# Patient Record
Sex: MALE | Race: WHITE | NOT HISPANIC OR LATINO | Employment: FULL TIME | URBAN - NONMETROPOLITAN AREA
[De-identification: names, ages, dates, MRNs, and addresses within clinical notes are randomized per-mention and may not be internally consistent; named-entity substitution may affect disease eponyms.]

---

## 2018-06-29 ENCOUNTER — OFFICE VISIT (OUTPATIENT)
Dept: URGENT CARE | Facility: PHYSICIAN GROUP | Age: 33
End: 2018-06-29
Payer: COMMERCIAL

## 2018-06-29 VITALS
OXYGEN SATURATION: 99 % | DIASTOLIC BLOOD PRESSURE: 78 MMHG | SYSTOLIC BLOOD PRESSURE: 116 MMHG | HEIGHT: 73 IN | BODY MASS INDEX: 25.18 KG/M2 | WEIGHT: 190 LBS | RESPIRATION RATE: 16 BRPM | TEMPERATURE: 98 F | HEART RATE: 62 BPM

## 2018-06-29 DIAGNOSIS — S39.012A LUMBOSACRAL STRAIN, INITIAL ENCOUNTER: Primary | ICD-10-CM

## 2018-06-29 PROCEDURE — 99204 OFFICE O/P NEW MOD 45 MIN: CPT | Performed by: PHYSICIAN ASSISTANT

## 2018-06-29 RX ORDER — METHYLPREDNISOLONE 4 MG/1
TABLET ORAL
Qty: 1 KIT | Refills: 0 | Status: SHIPPED | OUTPATIENT
Start: 2018-06-29 | End: 2021-04-09

## 2018-06-29 RX ORDER — CYCLOBENZAPRINE HCL 10 MG
10 TABLET ORAL 3 TIMES DAILY PRN
Qty: 30 TAB | Refills: 0 | Status: SHIPPED | OUTPATIENT
Start: 2018-06-29 | End: 2021-04-09 | Stop reason: SDUPTHER

## 2018-06-29 ASSESSMENT — ENCOUNTER SYMPTOMS
CHILLS: 0
TINGLING: 0
MYALGIAS: 1
FOCAL WEAKNESS: 0
FEVER: 0
BOWEL INCONTINENCE: 0
SENSORY CHANGE: 0
BACK PAIN: 1

## 2018-06-29 NOTE — LETTER
June 29, 2018         Patient: Ayo Reyna   YOB: 1985   Date of Visit: 6/29/2018           To Whom it May Concern:    Ayo Reyna was seen in my clinic on 6/29/2018. He may return to work on 07/01/2018 or sooner if condition improves sooner.     If you have any questions or concerns, please don't hesitate to call.        Sincerely,           Alesia Nielson P.A.-C.  Electronically Signed

## 2018-06-29 NOTE — PROGRESS NOTES
"Subjective:      Ayo Reyna is a 33 y.o. male who presents with Back Pain (x1d lower back pain/ Pt state that he injured himself 10 yrs ago and now the pain is coming back)    PMH:  Reviewed with patient/family member/EPIC.   MEDS: No current outpatient prescriptions on file.  ALLERGIES: Not on File  SURGHX: History reviewed. No pertinent surgical history.  SOCHX:  reports that he quit smoking about 2 years ago. His smoking use included Cigarettes. He has never used smokeless tobacco. He reports that he does not use drugs.  FH: Reviewed with patient/family. Not pertinent to this complaint.            Patient presents with:  Back Pain: x1d lower back pain/ Pt state that he injured himself 10 yrs ago and pain is coming back.  No particular injury , just woke up with spasms and occasional sharp pains with twisting or bending. Denies incontinence.           Back Pain   This is a new problem. The current episode started yesterday. The problem occurs constantly. The pain is present in the lumbar spine. The quality of the pain is described as cramping and aching. The pain is at a severity of 7/10. The pain is the same all the time. The symptoms are aggravated by twisting, sitting, lying down, coughing and bending. Stiffness is present all day. Pertinent negatives include no bladder incontinence, bowel incontinence, dysuria, fever or tingling. He has tried nothing for the symptoms. The treatment provided no relief.       Review of Systems   Constitutional: Negative for chills and fever.   Gastrointestinal: Negative for bowel incontinence.   Genitourinary: Negative for bladder incontinence and dysuria.   Musculoskeletal: Positive for back pain and myalgias.   Neurological: Negative for tingling, sensory change and focal weakness.   All other systems reviewed and are negative.         Objective:     /78   Pulse 62   Temp 36.7 °C (98 °F)   Resp 16   Ht 1.854 m (6' 1\")   Wt 86.2 kg (190 lb)   SpO2 99%   BMI " 25.07 kg/m²      Physical Exam   Constitutional: He is oriented to person, place, and time. He appears well-developed and well-nourished. No distress.   HENT:   Head: Normocephalic.   Eyes: Pupils are equal, round, and reactive to light.   Neck: Normal range of motion. Neck supple.   Cardiovascular: Normal rate, regular rhythm and normal heart sounds.    Pulmonary/Chest: Effort normal and breath sounds normal.   Abdominal: Soft. Bowel sounds are normal.   Musculoskeletal:        Lumbar back: He exhibits decreased range of motion (Secondary to pain/spasm), tenderness, pain and spasm. He exhibits no bony tenderness and normal pulse.   DISTAL N/V INTACT TO BLE, NO SADDLE ANESTHESIA NOTED, NO MIDLINE TTP TO ENTIRE SPINE.    Neurological: He is alert and oriented to person, place, and time. He has normal reflexes. He exhibits normal muscle tone. Coordination normal.   Skin: Skin is warm and dry.   Psychiatric: He has a normal mood and affect.   Nursing note and vitals reviewed.              Assessment/Plan:     1. Lumbosacral strain, initial encounter  cyclobenzaprine (FLEXERIL) 10 MG Tab    MethylPREDNISolone (MEDROL DOSEPAK) 4 MG Tablet Therapy Pack     Motrin/Advil/Ibuprophen 600 mg every 6 hours as needed for pain or fever.    PT instructed not to drive or operate heavy machinery or drink alcohol while taking this medication because it contains either a narcotic or benzodiazepines which causes drowsiness. PT verbalized understanding of these instructions.     [facility] Aware web site evaluation: I have obtained and reviewed patient utilization report from [facility] pharmacy database prior to writing prescription for controlled substance.  No history of abuse.    PT should follow up with PCP in 1-2 days for re-evaluation if symptoms have not improved.  Discussed red flags and reasons to return to UC or ED.  Pt and/or family verbalized understanding of diagnosis and follow up instructions and was offered  informational handout on diagnosis.  PT discharged.

## 2018-06-29 NOTE — PATIENT INSTRUCTIONS
Lumbosacral Strain  Lumbosacral strain is an injury that causes pain in the lower back (lumbosacral spine). This injury usually occurs from overstretching the muscles or ligaments along your spine. A strain can affect one or more muscles or cord-like tissues that connect bones to other bones (ligaments).  What are the causes?  This condition may be caused by:  · A hard, direct hit (blow) to the back.  · Excessive stretching of the lower back muscles. This may result from:  ¨ A fall.  ¨ Lifting something heavy.  ¨ Repetitive movements such as bending or crouching.  What increases the risk?  The following factors may increase your risk of getting this condition:  · Participating in sports or activities that involve:  ¨ A sudden twist of the back.  ¨ Pushing or pulling motions.  · Being overweight or obese.  · Having poor strength and flexibility, especially tight hamstrings or weak muscles in the back or abdomen.  · Having too much of a curve in the lower back.  · Having a pelvis that is tilted forward.  What are the signs or symptoms?  The main symptom of this condition is pain in the lower back, at the site of the strain. Pain may extend (radiate) down one or both legs.  How is this diagnosed?  This condition is diagnosed based on:  · Your symptoms.  · Your medical history.  · A physical exam.  ¨ Your health care provider may push on certain areas of your back to determine the source of your pain.  ¨ You may be asked to bend forward, backward, and side to side to assess the severity of your pain and your range of motion.  · Imaging tests, such as:  ¨ X-rays.  ¨ MRI.  How is this treated?  Treatment for this condition may include:  · Putting heat and cold on the affected area.  · Medicines to help relieve pain and relax your muscles (muscle relaxants).  · NSAIDs to help reduce swelling and discomfort.  When your symptoms improve, it is important to gradually return to your normal routine as soon as possible to reduce  pain, avoid stiffness, and avoid loss of muscle strength. Generally, symptoms should improve within 6 weeks of treatment. However, recovery time varies.  Follow these instructions at home:  Managing pain, stiffness, and swelling  · If directed, put ice on the injured area during the first 24 hours after your strain.  ¨ Put ice in a plastic bag.  ¨ Place a towel between your skin and the bag.  ¨ Leave the ice on for 20 minutes, 2-3 times a day.  · If directed, put heat on the affected area as often as told by your health care provider. Use the heat source that your health care provider recommends, such as a moist heat pack or a heating pad.  ¨ Place a towel between your skin and the heat source.  ¨ Leave the heat on for 20-30 minutes.  ¨ Remove the heat if your skin turns bright red. This is especially important if you are unable to feel pain, heat, or cold. You may have a greater risk of getting burned.  Activity  · Rest and return to your normal activities as told by your health care provider. Ask your health care provider what activities are safe for you.  · Avoid activities that take a lot of energy for as long as told by your health care provider.  General instructions  · Take over-the-counter and prescription medicines only as told by your health care provider.  · Do not drive or use heavy machinery while taking prescription pain medicine.  · Do not use any products that contain nicotine or tobacco, such as cigarettes and e-cigarettes. If you need help quitting, ask your health care provider.  · Keep all follow-up visits as told by your health care provider. This is important.  How is this prevented?  · Use correct form when playing sports and lifting heavy objects.  · Use good posture when sitting and standing.  · Maintain a healthy weight.  · Sleep on a mattress with medium firmness to support your back.  · Be safe and responsible while being active to avoid falls.  · Do at least 150 minutes of  moderate-intensity exercise each week, such as brisk walking or water aerobics. Try a form of exercise that takes stress off your back, such as swimming or stationary cycling.  · Maintain physical fitness, including:  ¨ Strength.  ¨ Flexibility.  ¨ Cardiovascular fitness.  ¨ Endurance.  Contact a health care provider if:  · Your back pain does not improve after 6 weeks of treatment.  · Your symptoms get worse.  Get help right away if:  · Your back pain is severe.  · You cannot stand or walk.  · You have difficulty controlling when you urinate or when you have a bowel movement.  · You feel nauseous or you vomit.  · Your feet get very cold.  · You have numbness, tingling, weakness, or problems using your arms or legs.  · You develop any of the following:  ¨ Shortness of breath.  ¨ Dizziness.  ¨ Pain in your legs.  ¨ Weakness in your buttocks or legs.  ¨ Discoloration of the skin on your toes or legs.  This information is not intended to replace advice given to you by your health care provider. Make sure you discuss any questions you have with your health care provider.  Document Released: 09/27/2006 Document Revised: 07/07/2017 Document Reviewed: 05/21/2017  [employer] [facility] Interactive Patient Education © 2017 [employer]

## 2018-08-14 ENCOUNTER — OFFICE VISIT (OUTPATIENT)
Dept: URGENT CARE | Facility: PHYSICIAN GROUP | Age: 33
End: 2018-08-14
Payer: COMMERCIAL

## 2018-08-14 ENCOUNTER — APPOINTMENT (OUTPATIENT)
Dept: RADIOLOGY | Facility: MEDICAL CENTER | Age: 33
End: 2018-08-14
Attending: NURSE PRACTITIONER
Payer: COMMERCIAL

## 2018-08-14 VITALS
TEMPERATURE: 97.9 F | SYSTOLIC BLOOD PRESSURE: 120 MMHG | BODY MASS INDEX: 23.46 KG/M2 | DIASTOLIC BLOOD PRESSURE: 76 MMHG | WEIGHT: 177 LBS | OXYGEN SATURATION: 95 % | HEIGHT: 73 IN | HEART RATE: 70 BPM | RESPIRATION RATE: 16 BRPM

## 2018-08-14 DIAGNOSIS — S09.90XA INJURY OF HEAD, INITIAL ENCOUNTER: ICD-10-CM

## 2018-08-14 DIAGNOSIS — S09.93XA FACIAL INJURY, INITIAL ENCOUNTER: ICD-10-CM

## 2018-08-14 PROCEDURE — 99214 OFFICE O/P EST MOD 30 MIN: CPT | Performed by: NURSE PRACTITIONER

## 2018-08-14 ASSESSMENT — ENCOUNTER SYMPTOMS
SPEECH CHANGE: 0
NUMBNESS: 0
NAUSEA: 0
DISORIENTATION: 0
DIZZINESS: 0
FEVER: 0
TREMORS: 0
SEIZURES: 0
CHILLS: 0
SENSORY CHANGE: 0
TINGLING: 0
LOSS OF CONSCIOUSNESS: 1
BLURRED VISION: 0
FOCAL WEAKNESS: 0
HEADACHES: 1
WEAKNESS: 0
VOMITING: 0
MEMORY LOSS: 1

## 2018-08-14 NOTE — LETTER
August 14, 2018       Patient: Ayo Reyna   YOB: 1985   Date of Visit: 8/14/2018         To Whom It May Concern:    It is my medical opinion that Ayo Reyna may return to work on 8/17/18.    If you have any questions or concerns, please don't hesitate to call [phone]          Sincerely,          Cathey J Hamman, A.P.N.  Electronically Signed
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
## 2019-01-06 ENCOUNTER — OFFICE VISIT (OUTPATIENT)
Dept: URGENT CARE | Facility: PHYSICIAN GROUP | Age: 34
End: 2019-01-06
Payer: COMMERCIAL

## 2019-01-06 VITALS
WEIGHT: 178.8 LBS | RESPIRATION RATE: 18 BRPM | HEIGHT: 73 IN | OXYGEN SATURATION: 96 % | DIASTOLIC BLOOD PRESSURE: 76 MMHG | TEMPERATURE: 98.4 F | HEART RATE: 69 BPM | BODY MASS INDEX: 23.7 KG/M2 | SYSTOLIC BLOOD PRESSURE: 110 MMHG

## 2019-01-06 DIAGNOSIS — R68.89 FLU-LIKE SYMPTOMS: ICD-10-CM

## 2019-01-06 DIAGNOSIS — J02.9 SORETHROAT: ICD-10-CM

## 2019-01-06 DIAGNOSIS — Z20.818 EXPOSURE TO STREP THROAT: ICD-10-CM

## 2019-01-06 LAB
FLUAV+FLUBV AG SPEC QL IA: NEGATIVE
INT CON NEG: NEGATIVE
INT CON NEG: NEGATIVE
INT CON POS: POSITIVE
INT CON POS: POSITIVE
S PYO AG THROAT QL: NEGATIVE

## 2019-01-06 PROCEDURE — 99214 OFFICE O/P EST MOD 30 MIN: CPT | Performed by: NURSE PRACTITIONER

## 2019-01-06 PROCEDURE — 87880 STREP A ASSAY W/OPTIC: CPT | Performed by: NURSE PRACTITIONER

## 2019-01-06 PROCEDURE — 87804 INFLUENZA ASSAY W/OPTIC: CPT | Performed by: NURSE PRACTITIONER

## 2019-01-06 NOTE — PROGRESS NOTES
"Subjective:      Ayo Reyna is a 33 y.o. male who presents with Pharyngitis (daughter positive for strep) and Cough (x 3)            Patient comes in today with a 3 day history of sore throat with cough, myalgias, and nasal congestion.  Daughter was diagnosed with strep throat earlier this week.  Not taking any meds to treat the symptoms.  No fever or chills.          Review of Systems   Constitutional: Positive for malaise/fatigue. Negative for chills, diaphoresis and fever.   HENT: Positive for congestion and sore throat. Negative for ear pain.    Respiratory: Positive for cough. Negative for hemoptysis, sputum production, shortness of breath and wheezing.    Cardiovascular: Negative for chest pain.   Musculoskeletal: Positive for myalgias.   Skin: Negative for rash.   Neurological: Negative for weakness.     Medications, Allergies, and current problem list reviewed today in Epic     Objective:     /76   Pulse 69   Temp 36.9 °C (98.4 °F) (Temporal)   Resp 18   Ht 1.854 m (6' 1\")   Wt 81.1 kg (178 lb 12.8 oz)   SpO2 96%   BMI 23.59 kg/m²      Physical Exam   Constitutional: He is oriented to person, place, and time. He appears well-developed and well-nourished. No distress.   HENT:   Head: Normocephalic.   Right Ear: External ear normal.   Left Ear: External ear normal.   Profuse clear nasal drainage.  No sinus TTP.  Generalized oropharyngeal erythema with no exudate or edema.  Uvula midline.  No muffled voice.     Eyes: Pupils are equal, round, and reactive to light. Conjunctivae are normal. Right eye exhibits no discharge. Left eye exhibits no discharge. No scleral icterus.   Neck: Neck supple. No JVD present. No tracheal deviation present. No thyromegaly present.   Cardiovascular: Normal rate, regular rhythm and normal heart sounds.  Exam reveals no gallop and no friction rub.    No murmur heard.  Pulmonary/Chest: Effort normal and breath sounds normal. No stridor. No respiratory distress. " He has no wheezes. He has no rales. He exhibits no tenderness.   Wet sounding cough in clinic.   Lymphadenopathy:     He has no cervical adenopathy.   Neurological: He is alert and oriented to person, place, and time.   Skin: Skin is warm and dry. He is not diaphoretic. No erythema. No pallor.   Vitals reviewed.    POCT rapid strep a: negative  POCT influenza a/b: negative          Assessment/Plan:     1. Sorethroat  POCT Rapid Strep A   2. Exposure to strep throat     3. Flu-like symptoms  POCT Influenza A/B     Advised patient that based on the history and exam findings, this is likely a self-limiting viral illness.  There is no indication for antibiotics at this time.  OTC NSAIDs or tylenol prn fever, pain.  OTC cold medications prn symptom management.  Maintain adequate po hydration.  RTC in 5-7 days if symptoms persist, sooner if worse.  Patient verbalized understanding of and agreed with plan of care.

## 2019-01-09 ASSESSMENT — ENCOUNTER SYMPTOMS
SPUTUM PRODUCTION: 0
SHORTNESS OF BREATH: 0
COUGH: 1
CHILLS: 0
DIAPHORESIS: 0
HEMOPTYSIS: 0
MYALGIAS: 1
WHEEZING: 0
FEVER: 0
WEAKNESS: 0
SORE THROAT: 1

## 2021-04-07 ENCOUNTER — TELEPHONE (OUTPATIENT)
Dept: SCHEDULING | Facility: IMAGING CENTER | Age: 36
End: 2021-04-07

## 2021-04-09 ENCOUNTER — OFFICE VISIT (OUTPATIENT)
Dept: MEDICAL GROUP | Facility: PHYSICIAN GROUP | Age: 36
End: 2021-04-09
Payer: COMMERCIAL

## 2021-04-09 VITALS
RESPIRATION RATE: 12 BRPM | WEIGHT: 200.3 LBS | OXYGEN SATURATION: 96 % | SYSTOLIC BLOOD PRESSURE: 120 MMHG | HEIGHT: 72 IN | TEMPERATURE: 99 F | HEART RATE: 69 BPM | BODY MASS INDEX: 27.13 KG/M2 | DIASTOLIC BLOOD PRESSURE: 64 MMHG

## 2021-04-09 DIAGNOSIS — R29.2 HYPERREFLEXIA: ICD-10-CM

## 2021-04-09 DIAGNOSIS — Z13.21 ENCOUNTER FOR VITAMIN DEFICIENCY SCREENING: ICD-10-CM

## 2021-04-09 DIAGNOSIS — Z13.6 SCREENING FOR CARDIOVASCULAR CONDITION: ICD-10-CM

## 2021-04-09 DIAGNOSIS — Z13.29 SCREENING FOR THYROID DISORDER: ICD-10-CM

## 2021-04-09 DIAGNOSIS — M54.50 ACUTE LEFT-SIDED LOW BACK PAIN WITHOUT SCIATICA: ICD-10-CM

## 2021-04-09 PROCEDURE — 99214 OFFICE O/P EST MOD 30 MIN: CPT | Performed by: NURSE PRACTITIONER

## 2021-04-09 RX ORDER — CYCLOBENZAPRINE HCL 10 MG
10 TABLET ORAL 3 TIMES DAILY PRN
Qty: 30 TABLET | Refills: 1 | Status: SHIPPED | OUTPATIENT
Start: 2021-04-09

## 2021-04-09 RX ORDER — METHYLPREDNISOLONE 4 MG/1
TABLET ORAL
Qty: 21 TABLET | Refills: 0 | Status: SHIPPED | OUTPATIENT
Start: 2021-04-09 | End: 2021-06-04

## 2021-04-09 ASSESSMENT — PATIENT HEALTH QUESTIONNAIRE - PHQ9: CLINICAL INTERPRETATION OF PHQ2 SCORE: 0

## 2021-04-09 NOTE — ASSESSMENT & PLAN NOTE
Patient is 36-year-old male here to establish care with me today.  Reports acute left side back pain, onset 3 weeks ago.  Not sure how it started, but usually starts with a sneeze or lifting something light.  Positive pain is sharp with intermittent spasm over her whole back.  Aggravated by sitting, standing, bending, supine.  Walking around for an hour helps loosen it up.  Will take Aleve occasionally.  Has been doing gentle stretching.  Went to chiropractor twice, thought it was helping, but then returned.  Denies leg pain or weakness.  Denies saddle paresthesia, foot drop, fever, fecal or urinary incontinence.  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  Reportedly had a lumbar strain.  Has had flareups once or twice a year since then.  Flareups are usually relieved within 3 to 4 days.

## 2021-04-09 NOTE — PROGRESS NOTES
CC: Establish care, low back pain    HISTORY OF THE PRESENT ILLNESS: Patient is a 36 y.o. male. This pleasant patient is here today to establish care and for evaluation and management of the following health problems.  Patient has not had primary care provider in many years.          Acute left-sided low back pain without sciatica  Patient is 36-year-old male here to establish care with me today.  Reports acute left side back pain, onset 3 weeks ago.  Not sure how it started, but usually starts with a sneeze or lifting something light.  Positive pain is sharp with intermittent spasm over her whole back.  Aggravated by sitting, standing, bending, supine.  Walking around for an hour helps loosen it up.  Will take Aleve occasionally.  Has been doing gentle stretching.  Went to chiropractor twice, thought it was helping, but then returned.  Denies leg pain or weakness.  Denies saddle paresthesia, foot drop, fever, fecal or urinary incontinence.  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  Reportedly had a lumbar strain.  Has had flareups once or twice a year since then.  Flareups are usually relieved within 3 to 4 days.      Allergies: Patient has no known allergies.    Current Outpatient Medications Ordered in Epic   Medication Sig Dispense Refill   • methylPREDNISolone (MEDROL DOSEPAK) 4 MG Tablet Therapy Pack As directed on the packaging label. 21 tablet 0   • cyclobenzaprine (FLEXERIL) 10 mg Tab Take 1 tablet by mouth 3 times a day as needed. 30 tablet 1     No current Epic-ordered facility-administered medications on file.       History reviewed. No pertinent past medical history.    Past Surgical History:   Procedure Laterality Date   • WRIST ORIF Right     with tendon and vessel repain       Social History     Tobacco Use   • Smoking status: Current Every Day Smoker     Packs/day: 0.50     Types: Cigarettes     Last attempt to quit: 6/29/2016      Years since quittin.7   • Smokeless tobacco: Never Used   Substance Use Topics   • Alcohol use: Yes     Alcohol/week: 4.8 oz     Types: 8 Standard drinks or equivalent per week   • Drug use: No       Family History   Family history unknown: Yes       ROS:     - Constitutional: Negative for fever, chills, unexpected weight change, and fatigue/generalized weakness.     - HEENT: Negative for headaches, vision changes, hearing changes, ear pain, rhinorrhea, sinus congestion, and sore throat.      - Respiratory: Negative for cough, dyspnea, and wheezing.      - Cardiovascular: Negative for chest pain, palpitations, orthopnea, and bilateral lower extremity edema.     - Gastrointestinal: Negative for heartburn, nausea, vomiting, abdominal pain, hematochezia, melena, diarrhea, constipation.     - Genitourinary: Negative for dysuria, polyuria, hematuria, pyuria, urinary urgency, and urinary incontinence.    - Musculoskeletal: As in HPI    - Skin: Negative for rash, itching, cyanotic skin color change.     - Neurological: Negative for dizziness, tingling, tremors, focal sensory deficit, focal weakness and headaches.     - Endo/Heme/Allergies: Does not bruise/bleed easily.     - Psychiatric/Behavioral: Negative for depression, suicidal/homicidal ideation and memory loss.         .      Exam: /64 (BP Location: Right arm, Patient Position: Sitting, BP Cuff Size: Adult)   Pulse 69   Temp 37.2 °C (99 °F) (Temporal)   Resp 12   Ht 1.829 m (6')   Wt 90.9 kg (200 lb 4.8 oz)   SpO2 96%  Body mass index is 27.17 kg/m².    General: Alert, pleasant, well nourished, well developed male in NAD  HEENT: Normocephalic. Eyes conjunctiva clear lids without ptosis, pupils equal and reactive to light, ears normal shape and contour, canals are clear bilaterally, tympanic membranes are pearly gray with good light reflex, nasal mucosa without erythema and drainage, oropharynx is without erythema, edema or exudates.   Neck: Supple  without bruit. Thyroid is not enlarged.  Pulmonary: Clear to ausculation.  Normal effort. No rales, ronchi, or wheezing.  Cardiovascular: Normal rate and rhythm without murmur. Carotid and radial pulses are intact and equal bilaterally.  No lower extremity edema.  Abdomen: Soft, nontender, nondistended. Normal bowel sounds. Liver and spleen are not palpable  Low back: no erythema or edema, left side tender to palpation, able to tiptoe and heel walk, limited active ROM secondary to pain, patellar DTR's 3+ and equal bilaterally  Lymph: No cervical or supraclavicular lymph nodes are palpable  Skin: Warm and dry.    Musculoskeletal: Normal gait.   Psych: Normal mood and affect. Alert and oriented. Judgment and insight is normal.    Please note that this dictation was created using voice recognition software. I have made every reasonable attempt to correct obvious errors, but I expect that there are errors of grammar and possibly content that I did not discover before finalizing the note.      Assessment/Plan  1. Acute left-sided low back pain without sciatica  Chronic low back pain with periodic flares.  No red flags on exam today.  Will prescribe Medrol dosepak and cyclobenzaprine.  Has tolerated in the past. Instructions and side effects reviewed.  Will get xray, refer to PT and physiatry.  - methylPREDNISolone (MEDROL DOSEPAK) 4 MG Tablet Therapy Pack; As directed on the packaging label.  Dispense: 21 tablet; Refill: 0  - cyclobenzaprine (FLEXERIL) 10 mg Tab; Take 1 tablet by mouth 3 times a day as needed.  Dispense: 30 tablet; Refill: 1  - DX-LUMBAR SPINE-4+ VIEWS; Future  - REFERRAL TO PHYSICAL THERAPY  - REFERRAL TO OUTPATIENT INTERVENTIONAL PAIN CLINIC    2. Screening for cardiovascular condition  Will notify patient of results.  - Comp Metabolic Panel; Future  - ESTIMATED GFR; Future  - Lipid Profile; Future  - CBC WITHOUT DIFFERENTIAL; Future    3. Encounter for vitamin deficiency screening    - VITAMIN D,25  HYDROXY; Future    4. Screening for thyroid disorder    - TSH WITH REFLEX TO FT4; Future    5. Hyperreflexia    - TSH WITH REFLEX TO FT4; Future    Patient will return to clinic annually or sooner if needed and pending lab results.

## 2021-06-04 ENCOUNTER — OFFICE VISIT (OUTPATIENT)
Dept: PHYSICAL MEDICINE AND REHAB | Facility: MEDICAL CENTER | Age: 36
End: 2021-06-04
Payer: COMMERCIAL

## 2021-06-04 VITALS
BODY MASS INDEX: 26.62 KG/M2 | DIASTOLIC BLOOD PRESSURE: 78 MMHG | HEIGHT: 73 IN | OXYGEN SATURATION: 98 % | TEMPERATURE: 98.4 F | WEIGHT: 200.84 LBS | SYSTOLIC BLOOD PRESSURE: 114 MMHG | HEART RATE: 46 BPM

## 2021-06-04 DIAGNOSIS — M62.838 MUSCLE SPASM: ICD-10-CM

## 2021-06-04 DIAGNOSIS — M54.50 LUMBOSACRAL PAIN: ICD-10-CM

## 2021-06-04 PROCEDURE — 99203 OFFICE O/P NEW LOW 30 MIN: CPT | Performed by: PHYSICAL MEDICINE & REHABILITATION

## 2021-06-04 ASSESSMENT — PATIENT HEALTH QUESTIONNAIRE - PHQ9
5. POOR APPETITE OR OVEREATING: 0 - NOT AT ALL
CLINICAL INTERPRETATION OF PHQ2 SCORE: 0

## 2021-06-04 ASSESSMENT — PAIN SCALES - GENERAL: PAINLEVEL: 3=SLIGHT PAIN

## 2021-06-04 NOTE — PROGRESS NOTES
New patient note    Physiatry (physical medicine and  Rehabilitation), interventional spine and sports medicine    Date of Service: 06/04/2021    Chief complaint:   Chief Complaint   Patient presents with   • New Patient     Low back pain       HISTORY    HPI: Ayo Reyna 36 y.o. male who presents today for evaluation of low back pain.  He reports that he has had pain for about 10-12 years.  He had an episode of injuring his back years ago.  A few times a year, he would have 1-2 days of back spasms.  More recently, it is about once a week.    This is about 2/10 on the NRS.  He has been seeing a chiropractor.  He was give medrol dose edgar without help.  He was given muscle relaxants and did not take these. Does not like to take pills.  Occasionally, takes aleve, no more than one time a week.    No radicular symptoms.  No bowel or bladder changes.    Since he was seen by his PCP on 04/09/2021, he has been doing exercises from his previous PT for the same problem.  He does these every day at home and has been for the last several months.  Was doing these a few times a week, previously.  He plays golf a few times a week.  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     Coughing and sneezing can immediately result in spasms.  Pain ranges from 2-9/10 on the NRS    Medical records review:  I reviewed the note from the referring provider Katya Fregoso     Previous treatments:    Physical Therapy: Yes    Medications the patient is tried: NSAIDs    Previous interventions: none    Previous surgeries to relieve the above pain:  none      ROS:   Red Flags ROS:   Fever, Chills, Sweats: Denies  Involuntary Weight Loss: Denies  Bladder Incontinence: Denies  Bowel Incontinence: Denies  Saddle Anesthesia: Denies    All other systems reviewed and negative.       PMHx:   History reviewed. No pertinent past medical history.    PSHx:    Past Surgical History:   Procedure Laterality Date   • WRIST ORIF Right     with tendon and vessel repain       Family history   Family History   Family history unknown: Yes       Medications:   Current Outpatient Medications   Medication   • cyclobenzaprine (FLEXERIL) 10 mg Tab     No current facility-administered medications for this visit.       Allergies:   No Known Allergies    Social Hx:   Social History     Socioeconomic History   • Marital status: Single     Spouse name: Not on file   • Number of children: Not on file   • Years of education: Not on file   • Highest education level: Not on file   Occupational History   • Not on file   Tobacco Use   • Smoking status: Former Smoker     Packs/day: 0.50     Types: Cigarettes     Quit date: 2016     Years since quittin.9   • Smokeless tobacco: Never Used   Substance and Sexual Activity   • Alcohol use: Yes     Alcohol/week: 3.6 oz     Types: 6 Standard drinks or equivalent per week   • Drug use: No   • Sexual activity: Yes     Partners: Female     Birth control/protection: Male Sterilization   Other Topics Concern   •  Service No   • Blood Transfusions No   • Caffeine Concern No   • Occupational Exposure No   • Hobby Hazards No   • Sleep Concern No   • Stress Concern No   • Weight Concern No   • Special Diet No   • Back Care Yes     Comment: seeing chiropractor   • Exercise Yes   • Bike Helmet No   • Seat Belt Yes   • Self-Exams No   Social History Narrative   • Not on file     Social Determinants of Health     Financial Resource Strain:    • Difficulty of Paying Living Expenses:    Food Insecurity:    • Worried About Running Out of Food in the Last Year:    • Ran Out of Food in the Last Year:    Transportation Needs:    • Lack of Transportation (Medical):    • Lack of Transportation (Non-Medical):    Physical Activity:    • Days of Exercise per Week:    • Minutes of Exercise per Session:    Stress:    • Feeling of Stress :    Social  "Connections:    • Frequency of Communication with Friends and Family:    • Frequency of Social Gatherings with Friends and Family:    • Attends Sabianist Services:    • Active Member of Clubs or Organizations:    • Attends Club or Organization Meetings:    • Marital Status:    Intimate Partner Violence:    • Fear of Current or Ex-Partner:    • Emotionally Abused:    • Physically Abused:    • Sexually Abused:          EXAMINATION     Physical Exam:   Vitals: /78 (BP Location: Right arm, Patient Position: Sitting, BP Cuff Size: Small adult)   Pulse (!) 46   Temp 36.9 °C (98.4 °F) (Temporal)   Ht 1.854 m (6' 1\")   Wt 91.1 kg (200 lb 13.4 oz)   SpO2 98%     Constitutional:   Body Habitus: Body mass index is 26.5 kg/m².  Cooperation: Fully cooperates with exam  Appearance: Well-groomed, well-nourished, not disheveled, in no acute distress    Eyes: No scleral icterus, no proptosis     ENT -no obvious auditory deficits, wearing a face mask    Skin -no rashes or lesions noted     Respiratory-  breathing comfortable on room air, no audible wheezing    Cardiovascular- capillary refills less than 2 seconds. No lower extremity edema is noted.     Gastrointestinal - no obvious abdominal masses, No tenderness to palpation in the abdomen    Psychiatric- alert and oriented ×3. Normal affect.     Gait - normal gait, no use of ambulatory device, nonantalgic. The patient can toe walk with ease. The patient can heel walk with ease..     Musculoskeletal -   Cervical spine   Inspection: No deformities of the skin over the cervical spine. No rashes or lesions.    full  A/P ROM in all directions, with  pain      No signs of muscular atrophy in bilateral upper extremities       Thoracic/Lumbar Spine/Sacral Spine/Hips   Inspection: No evidence of atrophy in bilateral lower extremities throughout     ROM: full  AROM with flexion, extension, lateral flexion, and rotation bilaterally, with pain in extension left greater than " right    Palpation:   No tenderness to palpation in midline at T1-T12 levels. No tenderness to palpation in the left and right of the midline T1-L5  palpation over SI joint: negative bilaterally    palpation over buttock: negative bilaterally    palpation in hip or over the greater trochanters: negative bilaterally      Lumbar spine Special tests  Neuro tension  Straight leg test negative bilaterally      HIP  Range of motion in the hips is within normal limits in internal rotation, external rotation bilaterally    SI joint tests  Observation patient sits on one buttocks: Negative  ENDER test negative bilaterally       Neuro       Key points for the international standards for neurological classification of spinal cord injury (ISNCSCI) to light touch.     Dermatome R L   C4 2 2   C5 2 2   C6 2 2   C7 2 2   C8 2 2   T1 2 2   T2 2 2   L2 2 2   L3 2 2   L4 2 2   L5 2 2   S1 2 2   S2 2 2         Motor Exam Upper Extremities   ? Myotome R L   Shoulder flexion C5 5 5   Elbow flexion C5 5 5   Wrist extension C6 5 5   Elbow extension C7 5 5   Finger flexion C8 5 5   Finger abduction T1 5 5         Motor Exam Lower Extremities    ? Myotome R L   Hip flexion L2 5 5   Knee extension L3 5 5   Ankle dorsiflexion L4 5 5   Toe extension L5 5 5   Ankle plantarflexion S1 5 5       Babinski sign negative bilaterally   Clonus of the ankle negative bilaterally     Reflexes  ?  R L   Biceps  2+ 2+   Brachioradialis  2+ 2+   Patella  2+ 2+   Achilles   2+ 2+       MEDICAL DECISION MAKING    Medical records review: see under HPI section.     DATA    Labs:   No results found for: SODIUM, POTASSIUM, CHLORIDE, CO2, ANION, GLUCOSE, BUN, CREATININE, CALCIUM, ASTSGOT, ALTSGPT, TBILIRUBIN, ALBUMIN, TOTPROTEIN, GLOBULIN, AGRATIO]    No results found for: PROTHROMBTM, INR     No results found for: WBC, RBC, HEMOGLOBIN, HEMATOCRIT, MCV, MCH, MCHC, MPV, NEUTSPOLYS, LYMPHOCYTES, MONOCYTES, EOSINOPHILS, BASOPHILS, HYPOCHROMIA, ANISOCYTOSIS     No  results found for: HBA1C     Imaging: I personally reviewed following images, these are my reads  NONE AVAILABLE    IMAGING radiology reads. I reviewed the following radiology reads   NONE AVAILABLE                                                                                                  Diagnosis   Visit Diagnoses     ICD-10-CM   1. Lumbosacral pain  M54.5   2. Muscle spasm  M62.838           ASSESSMENT:  Ayo Reyna 36 y.o. male seen for above     Ayo was seen today for new patient.    Diagnoses and all orders for this visit:    Lumbosacral pain  -     DX-LUMBAR SPINE-4+ VIEWS; Future  -     MR-LUMBAR SPINE-W/O; Future    Muscle spasm  -     DX-LUMBAR SPINE-4+ VIEWS; Future  -     MR-LUMBAR SPINE-W/O; Future      1. Ayo has continue pain in his back despite performing his home exercise program on a daily basis.    Reviewed his home exercise program.  Reviewed that these have also included stretches for the quadratus lumborum, where he does have some muscle spasms on the left greater than right.  2. Discussed getting xrays and MRI of the lumbar spine.  We will consider injections based on the findings.  3. Discussed using flexeril if he starts to have episode of onset of spasms.  Trial using 5mg and avoid drinking alcohol or operating machinery.  Side effects discussed and use of this for short-term, if needed.        Follow-up: Return in about 4 weeks (around 7/2/2021).      Please note that this dictation was created using voice recognition software. I have made every reasonable attempt to correct obvious errors but there may be errors of grammar and content that I may have overlooked prior to finalization of this note.      James Camarillo MD  Physical Medicine and Rehabilitation  Interventional Spine and Sports Physiatry  [facility]           Katya Jung,

## 2021-09-13 ENCOUNTER — HOSPITAL ENCOUNTER (OUTPATIENT)
Dept: RADIOLOGY | Facility: MEDICAL CENTER | Age: 36
End: 2021-09-13
Attending: PHYSICAL MEDICINE & REHABILITATION
Payer: COMMERCIAL

## 2021-09-13 DIAGNOSIS — M54.50 LUMBOSACRAL PAIN: ICD-10-CM

## 2021-09-13 DIAGNOSIS — M62.838 MUSCLE SPASM: ICD-10-CM

## 2021-09-13 PROCEDURE — 72148 MRI LUMBAR SPINE W/O DYE: CPT

## 2021-09-13 PROCEDURE — 72110 X-RAY EXAM L-2 SPINE 4/>VWS: CPT

## 2021-09-28 ENCOUNTER — OFFICE VISIT (OUTPATIENT)
Dept: PHYSICAL MEDICINE AND REHAB | Facility: MEDICAL CENTER | Age: 36
End: 2021-09-28
Payer: COMMERCIAL

## 2021-09-28 VITALS
DIASTOLIC BLOOD PRESSURE: 70 MMHG | WEIGHT: 199.52 LBS | OXYGEN SATURATION: 97 % | TEMPERATURE: 97.9 F | HEIGHT: 73 IN | SYSTOLIC BLOOD PRESSURE: 120 MMHG | BODY MASS INDEX: 26.44 KG/M2 | HEART RATE: 75 BPM

## 2021-09-28 DIAGNOSIS — M54.17 LUMBOSACRAL RADICULOPATHY: ICD-10-CM

## 2021-09-28 PROCEDURE — 99214 OFFICE O/P EST MOD 30 MIN: CPT | Performed by: PHYSICAL MEDICINE & REHABILITATION

## 2021-09-28 ASSESSMENT — PAIN SCALES - GENERAL: PAINLEVEL: 7=MODERATE-SEVERE PAIN

## 2021-09-28 ASSESSMENT — PATIENT HEALTH QUESTIONNAIRE - PHQ9: CLINICAL INTERPRETATION OF PHQ2 SCORE: 0

## 2021-09-29 NOTE — PROGRESS NOTES
Follow-up patient note    Physiatry (physical medicine and  Rehabilitation), interventional spine and sports medicine    Date of Service: 09/28/2021    Chief complaint:   Chief Complaint   Patient presents with   • Follow-Up     Lower back pain       HISTORY    HPI: Ayo Reyna 36 y.o. male who presents today for follow-up evaluation of low back pain.      Worsened pain in the left leg that seems to be worse since our visit on 06/04/2021.  Pain is 7/10 on the NRS.  Pain in the low back and radiates into the left leg. The calf and foot feel numb.  No focal weakness, but has noted more pain in the left leg.    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       Medical records review:  I reviewed the note from the referring provider Katya Fregoso     Previous treatments:    Physical Therapy: Yes    Medications the patient is tried: NSAIDs    Previous interventions: none    Previous surgeries to relieve the above pain:  none      ROS:   Red Flags ROS:   Fever, Chills, Sweats: Denies  Involuntary Weight Loss: Denies  Bladder Incontinence: Denies  Bowel Incontinence: Denies  Saddle Anesthesia: Denies    All other systems reviewed and negative.       PMHx:   History reviewed. No pertinent past medical history.    PSHx:   Past Surgical History:   Procedure Laterality Date   • WRIST ORIF Right     with tendon and vessel repain       Family history   Family History   Family history unknown: Yes       Medications:   Current Outpatient Medications   Medication   • cyclobenzaprine (FLEXERIL) 10 mg Tab     No current facility-administered medications for this visit.       Allergies:   No Known Allergies    Social Hx:   Social History     Socioeconomic History   • Marital status: Single     Spouse name: Not on file   • Number of children: Not on file   • Years of education: Not on file   • Highest education level: Not on file   Occupational History   • Not on file   Tobacco Use   •  "Smoking status: Former Smoker     Packs/day: 0.50     Types: Cigarettes     Quit date: 2016     Years since quittin.2   • Smokeless tobacco: Never Used   Substance and Sexual Activity   • Alcohol use: Yes     Alcohol/week: 3.6 oz     Types: 6 Standard drinks or equivalent per week   • Drug use: No   • Sexual activity: Yes     Partners: Female     Birth control/protection: Male Sterilization   Other Topics Concern   •  Service No   • Blood Transfusions No   • Caffeine Concern No   • Occupational Exposure No   • Hobby Hazards No   • Sleep Concern No   • Stress Concern No   • Weight Concern No   • Special Diet No   • Back Care Yes     Comment: seeing chiropractor   • Exercise Yes   • Bike Helmet No   • Seat Belt Yes   • Self-Exams No   Social History Narrative   • Not on file     Social Determinants of Health     Financial Resource Strain:    • Difficulty of Paying Living Expenses:    Food Insecurity:    • Worried About Running Out of Food in the Last Year:    • Ran Out of Food in the Last Year:    Transportation Needs:    • Lack of Transportation (Medical):    • Lack of Transportation (Non-Medical):    Physical Activity:    • Days of Exercise per Week:    • Minutes of Exercise per Session:    Stress:    • Feeling of Stress :    Social Connections:    • Frequency of Communication with Friends and Family:    • Frequency of Social Gatherings with Friends and Family:    • Attends [employer] Services:    • Active Member of Clubs or Organizations:    • Attends Club or Organization Meetings:    • Marital Status:    Intimate Partner Violence:    • Fear of Current or Ex-Partner:    • Emotionally Abused:    • Physically Abused:    • Sexually Abused:          EXAMINATION     Physical Exam:   Vitals: /70 (BP Location: Right arm, Patient Position: Sitting, BP Cuff Size: Small adult)   Pulse 75   Temp 36.6 °C (97.9 °F) (Temporal)   Ht 1.854 m (6' 1\")   Wt 90.5 kg (199 lb 8.3 oz)   SpO2 97% "     Constitutional:   Body Habitus: Body mass index is 26.32 kg/m².  Cooperation: Fully cooperates with exam  Appearance: Well-groomed, well-nourished, not disheveled, in no acute distress    Eyes: No scleral icterus, no proptosis     ENT -no obvious auditory deficits, wearing a face mask    Skin -no rashes or lesions noted     Respiratory-  breathing comfortable on room air, no audible wheezing    Cardiovascular- No lower extremity edema is noted.     Psychiatric- alert and oriented ×3. Normal affect.     Gait - normal gait, no use of ambulatory device, nonantalgic. Ten single leg toe raises intact bilaterally    Musculoskeletal -     Thoracic/Lumbar Spine/Sacral Spine/Hips   Inspection: No evidence of atrophy in bilateral lower extremities throughout     ROM: full  AROM with flexion, extension, lateral flexion, and rotation bilaterally, with pain in extension left greater than right    Palpation:   No tenderness to palpation in midline at T1-T12 levels. No tenderness to palpation in the left and right of the midline T1-L5  palpation over SI joint: negative bilaterally    palpation over buttock: negative bilaterally    palpation in hip or over the greater trochanters: negative bilaterally      Lumbar spine Special tests  Neuro tension  Straight leg test positive on the left and negative on the right    Neuro     Key points for the international standards for neurological classification of spinal cord injury (ISNCSCI) to light touch.     Dermatome R L   L2 2 2   L3 2 2   L4 2 2   L5 2 2   S1 2 2   S2 2 2       Motor Exam Lower Extremities    ? Myotome R L   Hip flexion L2 5 5   Knee extension L3 5 5   Ankle dorsiflexion L4 5 5   Toe extension L5 5 5   Ankle plantarflexion S1 5 5       Reflexes  ?  R L   Patella  2+ 2+   Achilles   2+ 2+       MEDICAL DECISION MAKING    Medical records review: see under HPI section.     DATA    Labs:   No results found for: SODIUM, POTASSIUM, CHLORIDE, CO2, ANION, GLUCOSE, BUN,  CREATININE, CALCIUM, ASTSGOT, ALTSGPT, TBILIRUBIN, ALBUMIN, TOTPROTEIN, GLOBULIN, AGRATIO]    No results found for: PROTHROMBTM, INR     No results found for: WBC, RBC, HEMOGLOBIN, HEMATOCRIT, MCV, MCH, MCHC, MPV, NEUTSPOLYS, LYMPHOCYTES, MONOCYTES, EOSINOPHILS, BASOPHILS, HYPOCHROMIA, ANISOCYTOSIS     No results found for: HBA1C     Imaging: I personally reviewed following images, these are my reads  MRI lumbar spine 09/13/2021  At L1-2, no central or foraminal stenosis  At L2-3, no central or foraminal stenosis  At L3-4, no central or foraminal stenosis  At L4-5, no central or foraminal stenosis, there is facet arthropathy  At L5-S1, left paracentral disc extrusion with moderate left lateral recess stenosis and negative foraminal stenosis bilaterally      IMAGING radiology reads. I reviewed the following radiology reads   MRI lumbar 09/13/2021     IMPRESSION:     1.  L5-S1 moderate-sized left paracentral/lateral disc extrusion moderately narrowing the left lateral recess and foramen  2.  No other significant finding  3.  Incidental right renal cyst  4.  No follow up imaging is recommended per consensus guidelines of the 2019 ACR Incidental Findings Committee for probably benign incidental simple appearing renal cystic lesion(s) based on imaging criteria.      Xray lumbar spine 09/13/2021  IMPRESSION:     Negative lumbar spine series                                                                                            Diagnosis   Visit Diagnoses     ICD-10-CM   1. Lumbar radiculopathy  M54.16   2. Lumbosacral radiculopathy  M54.17           ASSESSMENT:  Ayo Leongwell 36 y.o. male seen for above     Ayo was seen today for follow-up.    Diagnoses and all orders for this visit:    Lumbar radiculopathy    Lumbosacral radiculopathy  -     REFERRAL TO OUTPATIENT INTERVENTIONAL PAIN CLINIC       1. Discussed findings on MRI lumbar spine.  2. Discussed left lumbar five transforaminal epidural steroid  injection. The risks benefits and alternatives to this procedure were discussed and the patient wishes to proceed with the procedure. Risks include but are not limited to damage to surrounding structures, infection, bleeding, worsening of pain which can be permanent, weakness which can be permanent. Benefits include pain relief, improved function. Alternatives includes not doing the procedure.  3. Discussed plan for injection.  We discussed trial of conservative care with possible referral to surgery, but he would like to trial conservative care first.  Declines medications at this time.  Advised he continue with PT exercises and caution with lifting, given findings on MRI.        Follow-up: Return for Hospital injection.      Please note that this dictation was created using voice recognition software. I have made every reasonable attempt to correct obvious errors but there may be errors of grammar and content that I may have overlooked prior to finalization of this note.      James Camarillo MD  Physical Medicine and Rehabilitation  Interventional Spine and Sports Physiatry  [facility]       Katya Jung,

## 2021-09-29 NOTE — PATIENT INSTRUCTIONS
Your procedure will be at the [facility] special procedure suite.    [facility] [place] [address]       PRE-PROCEDURE INSTRUCTIONS  You may take your regular medications except:   · No Anti-inflammatories 5 days prior to your procedure. Anti-inflammatories include medicines such as  ibuprofen (Motrin, Advil), Excedrin, Naproxen (Aleve, Anaprox, Naprelan, Naprosyn), Celecoxib (Celebrex), Diclofenac (Voltaren-XR tab), and Meloxicam (Mobic).   · No Glucophage or Metformin 24 hours before your procedure. You may resume next day after your procedure.  · Call the physiatry office if you are taking or prescribed anti-biotics within five days of procedure.  · Please ask provider if you are taking any new diabetes medication.  · CONTINUE TAKING BLOOD PRESSURE MEDICATIONS AS PRESCRIBED.  · Pain medications will not be prescribed on the procedure day. Procedural pain medication may be used by your provider   · Call your doctor's office performing the procedure if you have a fever, chills, rash or new illness prior to your procedure    Anticoagulation/antiplatelet medications  No Blood thinning medications such as Coumadin or Plavix 5 days prior to procedure unless your doctor said to continue these medications. Call your doctor if a new medication is prescribed in this class.     Restrictions for eating before procedure:   · If you are getting procedural sedation, then do not eat to for 8 hours prior to procedure appointment time. Do not drink fluids for four hours prior to your procedure time.   · If you are not having procedural sedation, then Skip the meal prior to your procedure. If you have a morning procedure then skip breakfast. If you have an afternoon procedure then skip lunch.   · You may drink clear liquids up to 2 hours prior to your procedure  · You must have a  the day of procedure to accompany you home.      POST PROCEDURE INSTRUCTIONS   · No heavy lifting, strenuous bending or  strenuous exercise for 3 days after your procedure.  · No hot tubs, baths, swimming for 3 days after your procedure  · You can remove the bandage the day after the procedure.  · IF YOU RECEIVED A STEROID INJECTION. PLEASE NOTE THAT THERE MAY BE A DELAY FOR THE INJECTION TO START WORKING, THE DELAY MAY BE UP TO TWO WEEKS. IF YOU HAVE DIABETES, PLEASE NOTE THAT YOUR SUGAR LEVELS MAY BE ELEVATED FOR 1-2 DAYS AFTER A STEROID INJECTION.  THE STEROID MAY CAUSE TEMPORARY SYMPTOMS WHICH USUALLY RESOLVE ON THEIR OWN WITHIN 1 TO 2 DAYS INCLUDING FACIAL FLUSHING OR A FEELING OF WARMTH ON THE FACE, TEMPORARY INCREASES IN BLOOD SUGAR, INSOMNIA, INCREASED HUNGER  · IF YOU RECEIVED A DIAGNOSTIC PROCEDURE (SUCH AS A MEDIAL BRANCH BLOCK), PLEASE NOTE THAT WE DO EXPECT THIS INJECTION TO WEAR OFF.  IT IS IMPORTANT TO COMPLETE THE PAIN DIARY AND LIST THE PAIN SCORE ONLY FOR THE REGION WHERE THE PROCEDURE WAS AND BRING THIS TO YOUR FOLLOW UP VISIT.  · IF YOU RECEIVED A RADIOFREQUENCY PROCEDURE, THERE MAY BE SOME SORENESS AFTER THE PROCEDURE.  THIS IS NORMAL.  · IF YOU EXPERIENCE PROLONGED WEAKNESS LONGER THAN ONE DAY, BOWEL OR BLADDER INCONTINENCE THEN PLEASE CALL THE PHYSIATRY OFFICE.  · Your leg may feel heavy, weak and numb for up to 1-2 days. Be very careful walking.   ·  You may resume normal activities 3 days after procedure.

## 2021-09-29 NOTE — H&P (VIEW-ONLY)
Follow-up patient note    Physiatry (physical medicine and  Rehabilitation), interventional spine and sports medicine    Date of Service: 09/28/2021    Chief complaint:   Chief Complaint   Patient presents with   • Follow-Up     Lower back pain       HISTORY    HPI: Ayo Reyna 36 y.o. male who presents today for follow-up evaluation of low back pain.      Worsened pain in the left leg that seems to be worse since our visit on 06/04/2021.  Pain is 7/10 on the NRS.  Pain in the low back and radiates into the left leg. The calf and foot feel numb.  No focal weakness, but has noted more pain in the left leg.    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       Medical records review:  I reviewed the note from the referring provider Katya Fregoso     Previous treatments:    Physical Therapy: Yes    Medications the patient is tried: NSAIDs    Previous interventions: none    Previous surgeries to relieve the above pain:  none      ROS:   Red Flags ROS:   Fever, Chills, Sweats: Denies  Involuntary Weight Loss: Denies  Bladder Incontinence: Denies  Bowel Incontinence: Denies  Saddle Anesthesia: Denies    All other systems reviewed and negative.       PMHx:   History reviewed. No pertinent past medical history.    PSHx:   Past Surgical History:   Procedure Laterality Date   • WRIST ORIF Right     with tendon and vessel repain       Family history   Family History   Family history unknown: Yes       Medications:   Current Outpatient Medications   Medication   • cyclobenzaprine (FLEXERIL) 10 mg Tab     No current facility-administered medications for this visit.       Allergies:   No Known Allergies    Social Hx:   Social History     Socioeconomic History   • Marital status: Single     Spouse name: Not on file   • Number of children: Not on file   • Years of education: Not on file   • Highest education level: Not on file   Occupational History   • Not on file   Tobacco Use   •  "Smoking status: Former Smoker     Packs/day: 0.50     Types: Cigarettes     Quit date: 2016     Years since quittin.2   • Smokeless tobacco: Never Used   Substance and Sexual Activity   • Alcohol use: Yes     Alcohol/week: 3.6 oz     Types: 6 Standard drinks or equivalent per week   • Drug use: No   • Sexual activity: Yes     Partners: Female     Birth control/protection: Male Sterilization   Other Topics Concern   •  Service No   • Blood Transfusions No   • Caffeine Concern No   • Occupational Exposure No   • Hobby Hazards No   • Sleep Concern No   • Stress Concern No   • Weight Concern No   • Special Diet No   • Back Care Yes     Comment: seeing chiropractor   • Exercise Yes   • Bike Helmet No   • Seat Belt Yes   • Self-Exams No   Social History Narrative   • Not on file     Social Determinants of Health     Financial Resource Strain:    • Difficulty of Paying Living Expenses:    Food Insecurity:    • Worried About Running Out of Food in the Last Year:    • Ran Out of Food in the Last Year:    Transportation Needs:    • Lack of Transportation (Medical):    • Lack of Transportation (Non-Medical):    Physical Activity:    • Days of Exercise per Week:    • Minutes of Exercise per Session:    Stress:    • Feeling of Stress :    Social Connections:    • Frequency of Communication with Friends and Family:    • Frequency of Social Gatherings with Friends and Family:    • Attends [employer] Services:    • Active Member of Clubs or Organizations:    • Attends Club or Organization Meetings:    • Marital Status:    Intimate Partner Violence:    • Fear of Current or Ex-Partner:    • Emotionally Abused:    • Physically Abused:    • Sexually Abused:          EXAMINATION     Physical Exam:   Vitals: /70 (BP Location: Right arm, Patient Position: Sitting, BP Cuff Size: Small adult)   Pulse 75   Temp 36.6 °C (97.9 °F) (Temporal)   Ht 1.854 m (6' 1\")   Wt 90.5 kg (199 lb 8.3 oz)   SpO2 97% "     Constitutional:   Body Habitus: Body mass index is 26.32 kg/m².  Cooperation: Fully cooperates with exam  Appearance: Well-groomed, well-nourished, not disheveled, in no acute distress    Eyes: No scleral icterus, no proptosis     ENT -no obvious auditory deficits, wearing a face mask    Skin -no rashes or lesions noted     Respiratory-  breathing comfortable on room air, no audible wheezing    Cardiovascular- No lower extremity edema is noted.     Psychiatric- alert and oriented ×3. Normal affect.     Gait - normal gait, no use of ambulatory device, nonantalgic. Ten single leg toe raises intact bilaterally    Musculoskeletal -     Thoracic/Lumbar Spine/Sacral Spine/Hips   Inspection: No evidence of atrophy in bilateral lower extremities throughout     ROM: full  AROM with flexion, extension, lateral flexion, and rotation bilaterally, with pain in extension left greater than right    Palpation:   No tenderness to palpation in midline at T1-T12 levels. No tenderness to palpation in the left and right of the midline T1-L5  palpation over SI joint: negative bilaterally    palpation over buttock: negative bilaterally    palpation in hip or over the greater trochanters: negative bilaterally      Lumbar spine Special tests  Neuro tension  Straight leg test positive on the left and negative on the right    Neuro     Key points for the international standards for neurological classification of spinal cord injury (ISNCSCI) to light touch.     Dermatome R L   L2 2 2   L3 2 2   L4 2 2   L5 2 2   S1 2 2   S2 2 2       Motor Exam Lower Extremities    ? Myotome R L   Hip flexion L2 5 5   Knee extension L3 5 5   Ankle dorsiflexion L4 5 5   Toe extension L5 5 5   Ankle plantarflexion S1 5 5       Reflexes  ?  R L   Patella  2+ 2+   Achilles   2+ 2+       MEDICAL DECISION MAKING    Medical records review: see under HPI section.     DATA    Labs:   No results found for: SODIUM, POTASSIUM, CHLORIDE, CO2, ANION, GLUCOSE, BUN,  CREATININE, CALCIUM, ASTSGOT, ALTSGPT, TBILIRUBIN, ALBUMIN, TOTPROTEIN, GLOBULIN, AGRATIO]    No results found for: PROTHROMBTM, INR     No results found for: WBC, RBC, HEMOGLOBIN, HEMATOCRIT, MCV, MCH, MCHC, MPV, NEUTSPOLYS, LYMPHOCYTES, MONOCYTES, EOSINOPHILS, BASOPHILS, HYPOCHROMIA, ANISOCYTOSIS     No results found for: HBA1C     Imaging: I personally reviewed following images, these are my reads  MRI lumbar spine 09/13/2021  At L1-2, no central or foraminal stenosis  At L2-3, no central or foraminal stenosis  At L3-4, no central or foraminal stenosis  At L4-5, no central or foraminal stenosis, there is facet arthropathy  At L5-S1, left paracentral disc extrusion with moderate left lateral recess stenosis and negative foraminal stenosis bilaterally      IMAGING radiology reads. I reviewed the following radiology reads   MRI lumbar 09/13/2021     IMPRESSION:     1.  L5-S1 moderate-sized left paracentral/lateral disc extrusion moderately narrowing the left lateral recess and foramen  2.  No other significant finding  3.  Incidental right renal cyst  4.  No follow up imaging is recommended per consensus guidelines of the 2019 ACR Incidental Findings Committee for probably benign incidental simple appearing renal cystic lesion(s) based on imaging criteria.      Xray lumbar spine 09/13/2021  IMPRESSION:     Negative lumbar spine series                                                                                            Diagnosis   Visit Diagnoses     ICD-10-CM   1. Lumbar radiculopathy  M54.16   2. Lumbosacral radiculopathy  M54.17           ASSESSMENT:  Ayo Leongwell 36 y.o. male seen for above     Aoy was seen today for follow-up.    Diagnoses and all orders for this visit:    Lumbar radiculopathy    Lumbosacral radiculopathy  -     REFERRAL TO OUTPATIENT INTERVENTIONAL PAIN CLINIC       1. Discussed findings on MRI lumbar spine.  2. Discussed left lumbar five transforaminal epidural steroid  injection. The risks benefits and alternatives to this procedure were discussed and the patient wishes to proceed with the procedure. Risks include but are not limited to damage to surrounding structures, infection, bleeding, worsening of pain which can be permanent, weakness which can be permanent. Benefits include pain relief, improved function. Alternatives includes not doing the procedure.  3. Discussed plan for injection.  We discussed trial of conservative care with possible referral to surgery, but he would like to trial conservative care first.  Declines medications at this time.  Advised he continue with PT exercises and caution with lifting, given findings on MRI.        Follow-up: Return for Hospital injection.      Please note that this dictation was created using voice recognition software. I have made every reasonable attempt to correct obvious errors but there may be errors of grammar and content that I may have overlooked prior to finalization of this note.      James Camarillo MD  Physical Medicine and Rehabilitation  Interventional Spine and Sports Physiatry  [facility]       Katya Jung,

## 2021-10-20 ENCOUNTER — HOSPITAL ENCOUNTER (OUTPATIENT)
Facility: REHABILITATION | Age: 36
End: 2021-10-20
Attending: PHYSICAL MEDICINE & REHABILITATION | Admitting: PHYSICAL MEDICINE & REHABILITATION
Payer: COMMERCIAL

## 2021-10-20 ENCOUNTER — APPOINTMENT (OUTPATIENT)
Dept: RADIOLOGY | Facility: REHABILITATION | Age: 36
End: 2021-10-20
Attending: PHYSICAL MEDICINE & REHABILITATION
Payer: COMMERCIAL

## 2021-10-20 VITALS
SYSTOLIC BLOOD PRESSURE: 143 MMHG | OXYGEN SATURATION: 94 % | BODY MASS INDEX: 26.62 KG/M2 | DIASTOLIC BLOOD PRESSURE: 80 MMHG | TEMPERATURE: 98.3 F | WEIGHT: 200.84 LBS | HEART RATE: 52 BPM | HEIGHT: 73 IN | RESPIRATION RATE: 16 BRPM

## 2021-10-20 PROCEDURE — 700117 HCHG RX CONTRAST REV CODE 255

## 2021-10-20 PROCEDURE — 700101 HCHG RX REV CODE 250

## 2021-10-20 PROCEDURE — 700111 HCHG RX REV CODE 636 W/ 250 OVERRIDE (IP)

## 2021-10-20 PROCEDURE — 64483 NJX AA&/STRD TFRM EPI L/S 1: CPT

## 2021-10-20 RX ORDER — DEXAMETHASONE SODIUM PHOSPHATE 10 MG/ML
INJECTION, SOLUTION INTRAMUSCULAR; INTRAVENOUS
Status: COMPLETED
Start: 2021-10-20 | End: 2021-10-20

## 2021-10-20 RX ORDER — LIDOCAINE HYDROCHLORIDE 20 MG/ML
INJECTION, SOLUTION EPIDURAL; INFILTRATION; INTRACAUDAL; PERINEURAL
Status: DISCONTINUED
Start: 2021-10-20 | End: 2021-10-20 | Stop reason: HOSPADM

## 2021-10-20 RX ORDER — LIDOCAINE HYDROCHLORIDE 20 MG/ML
INJECTION, SOLUTION EPIDURAL; INFILTRATION; INTRACAUDAL; PERINEURAL
Status: COMPLETED
Start: 2021-10-20 | End: 2021-10-20

## 2021-10-20 RX ADMIN — DEXAMETHASONE SODIUM PHOSPHATE 10 MG: 10 INJECTION, SOLUTION INTRAMUSCULAR; INTRAVENOUS at 08:09

## 2021-10-20 RX ADMIN — LIDOCAINE HYDROCHLORIDE 5 ML: 20 INJECTION, SOLUTION EPIDURAL; INFILTRATION; INTRACAUDAL; PERINEURAL at 08:09

## 2021-10-20 RX ADMIN — IOHEXOL 5 ML: 240 INJECTION, SOLUTION INTRATHECAL; INTRAVASCULAR; INTRAVENOUS; ORAL at 08:09

## 2021-10-20 ASSESSMENT — PAIN DESCRIPTION - PAIN TYPE: TYPE: CHRONIC PAIN

## 2021-10-20 NOTE — OP REPORT
Date of service: 10/20/2021    Pre-operative Diagnosis: Lumbosacral radiculopathy(M54.17)     Post-operative Diagnosis:Lumbosacral radiculopathy(M54.17)    Procedure:Left Lumbar Transforaminal Epidural Steroid  at the L5-S1 levels.      Type of Anesthesia: Local anesthesia    Blood Loss: None    Physician: James Camairllo MD     Description of procedure:     The risks, benefits, and alternatives of the procedure were reviewed and discussed with the patient.  Written informed consent was freely obtained. A pre-procedural time-out was conducted by the physician verifying patient’s identity, procedure to be performed, procedure site and side, and allergy verification. Appropriate equipment was determined to be in place for the procedure.      Method of Procedure: The patient signed an informed consent form in the pre-op area after all risks and complications were discussed and all questions were answered.     The patient was prepped and draped in a sterile fashion in the prone position.  The patient's spine was surveyed under fluoroscopic visualization and anatomical landmarks were identified.     The patient's vital signs were carefully monitored before and after the procedure.        Left L5 transforaminal epidural steroid injection     The fluoroscope was placed over the lumbar spine and adjusted into the proper AP/Oblique view to enter the transforaminal space at the levels below. The targets for injection were then marked at the left L5-S1. A 25g 1.5 inch needle was placed into the marked site, and approx 2cc of 1% Lidocaine was injected subcutaneously into the epidermal and dermal layers. The needle was removed. A 25 gauge 3.5 inch spinal needle was then placed and advanced under fluoroscopic guidance in an oblique view towards the subpedicular epidural space of the levels noted below. The needle position was confirmed to not be past the 6 o'clock position in the AP view and it was in the neural foramen and the  "lateral view. Under live fluoroscopic guidance in the AP view, contrast dye was used to highlight the epidural space spread.  Following negative aspiration, approx 1.5 cc of 2% lidocaine preservative free with 1cc of dexamethasone(10mg/cc) and 0.5 cc of normal saline was then injected at each level, and the needles were removed intact after restyleted. The patient's back was covered with a 4x4 gauze, the area was cleansed with sterile normal saline, and a dressing was applied. There were no complications noted.         Perisheathogram and Spot Film:  After Omnipaque was injected, a left L5 \"perisheathogram\" occurred without evidence of subarachnoid or vascular flow.  A spot films was ordered in AP and lateral to confirm the correct needle tip placement.     The patient was then evaluated post-procedure, and was hemodynamically stable prior to leaving the post-operative care unit.      James Camarillo MD  Physical Medicine and Rehabilitation  Interventional Spine and Sports Physiatry  [facility]     CPT: [id number]  "

## 2021-10-20 NOTE — PROGRESS NOTES
0800 Preprocedure assessment completed.  Pertinent health information(unremarkable) communicated to physician and staff prior to time out.  Patient positioned preprocedure by RN, CSTand XRAY.  Foam wedge under ankles for support.

## 2021-10-20 NOTE — INTERVAL H&P NOTE
"H&P reviewed. The patient was examined and there are no changes to the H&P      /76   Pulse (!) 52   Temp 36.8 °C (98.3 °F) (Temporal)   Resp 16   Ht 1.854 m (6' 1\")   Wt 91.1 kg (200 lb 13.4 oz)   SpO2 95%     CV: RRR, Normal S1, S2  RESP: Clear to auscultation bilaterally    Continue with injection as planned.    James Camarillo MD  Physical Medicine and Rehabilitation  Interventional Spine and Sports Physiatry  [facility]      "

## 2021-10-20 NOTE — PROGRESS NOTES
Pt received to recovery area with report from procedure room RN Nancy.  VSS.  Ice compress applied to the affected area.  No swelling noted, dressing CDI.  Pt tolerates PO fluids and snack without difficulty.  Dr. Camarillo evaluated patient.  Meets criteria for discharge.  Pt ambulatory without difficulty.  Discharged to designated  at 0833.

## 2021-10-20 NOTE — PROGRESS NOTES
0720 Pt admitted to pre-procedure area.  Procedure and site confirmed, consent signed.  VSS.  Medication allergies and current medications reviewed in Epic.  Designated  waiting in the car.  Printed discharge instructions reviewed and signed.  Pertinent medical information (Healthy) reviewed in Epic and communicated to procedure RN.  Pt denies taking any NSAIDS/blood-thinners/anti-coagulants in the last 5 days.

## 2021-10-21 ENCOUNTER — TELEPHONE (OUTPATIENT)
Dept: PHYSICAL MEDICINE AND REHAB | Facility: MEDICAL CENTER | Age: 36
End: 2021-10-21

## 2021-10-21 NOTE — TELEPHONE ENCOUNTER
I called patient to follow up after his Special procedure and leave a message to call us back and inform.

## 2021-10-24 ENCOUNTER — HOSPITAL ENCOUNTER (EMERGENCY)
Facility: MEDICAL CENTER | Age: 36
End: 2021-10-24
Attending: EMERGENCY MEDICINE
Payer: COMMERCIAL

## 2021-10-24 VITALS
HEIGHT: 73 IN | SYSTOLIC BLOOD PRESSURE: 124 MMHG | OXYGEN SATURATION: 94 % | TEMPERATURE: 97.9 F | HEART RATE: 84 BPM | BODY MASS INDEX: 27.03 KG/M2 | DIASTOLIC BLOOD PRESSURE: 74 MMHG | RESPIRATION RATE: 18 BRPM | WEIGHT: 203.93 LBS

## 2021-10-24 DIAGNOSIS — M54.17 LUMBOSACRAL RADICULOPATHY: ICD-10-CM

## 2021-10-24 PROCEDURE — 96372 THER/PROPH/DIAG INJ SC/IM: CPT

## 2021-10-24 PROCEDURE — 99283 EMERGENCY DEPT VISIT LOW MDM: CPT

## 2021-10-24 PROCEDURE — 700111 HCHG RX REV CODE 636 W/ 250 OVERRIDE (IP): Performed by: EMERGENCY MEDICINE

## 2021-10-24 RX ORDER — HYDROCODONE BITARTRATE AND ACETAMINOPHEN 5; 325 MG/1; MG/1
1 TABLET ORAL EVERY 6 HOURS PRN
Qty: 10 TABLET | Refills: 0 | Status: SHIPPED | OUTPATIENT
Start: 2021-10-24 | End: 2021-10-27

## 2021-10-24 RX ORDER — HYDROMORPHONE HYDROCHLORIDE 1 MG/ML
1 INJECTION, SOLUTION INTRAMUSCULAR; INTRAVENOUS; SUBCUTANEOUS ONCE
Status: COMPLETED | OUTPATIENT
Start: 2021-10-24 | End: 2021-10-24

## 2021-10-24 RX ORDER — ONDANSETRON 4 MG/1
4 TABLET, ORALLY DISINTEGRATING ORAL ONCE
Status: COMPLETED | OUTPATIENT
Start: 2021-10-24 | End: 2021-10-24

## 2021-10-24 RX ADMIN — ONDANSETRON 4 MG: 4 TABLET, ORALLY DISINTEGRATING ORAL at 18:43

## 2021-10-24 RX ADMIN — HYDROMORPHONE HYDROCHLORIDE 1 MG: 1 INJECTION, SOLUTION INTRAMUSCULAR; INTRAVENOUS; SUBCUTANEOUS at 18:44

## 2021-10-24 NOTE — ED TRIAGE NOTES
Amb to triage w/ c/o back pain radiating down LLE w/ some numbness.  Pt reports hx of herniated disc, had a epidural on 10/20, was feeling better but then symptoms worsened 10/21.  Pt appears uncomfortable.

## 2021-10-25 NOTE — ED PROVIDER NOTES
ED Provider Note    CHIEF COMPLAINT  Chief Complaint   Patient presents with   • Low Back Pain   • Leg Pain       HPI  Ayo Reyna is a 36 y.o. male who presents complaining of chronic pain in his low back radiating down his left leg.  He states that he had an injury years ago and occasionally gets flareups to his back.  He sees pain management Dr. Camarillo and had an epidural last Wednesday.  He states that initially after the epidural he felt much better however 2 days ago the pain got suddenly worse.  He denies any falls or trauma.  He denies any numbness in the groin weakness in the legs or loss of bowel or bladder control.  He states he has a known disc and S1 and he is doing all this to try to avoid surgery.  He states he has not slept in 2 days because of the pain.  He has tried over-the-counter anti-inflammatories and a muscle relaxant.  He denies any fevers or chills.  He has no diabetes or poor wound healing.  He denies any drainage from the epidural site.  He denies any dysuria frequency urgency.    REVIEW OF SYSTEMS  See HPI for further details.  Positive radicular pain.  No fevers no dysuria no abdominal pain vomiting dizziness or lightheadedness    PAST MEDICAL HISTORY  No past medical history on file.    FAMILY HISTORY  Family History   Family history unknown: Yes       SOCIAL HISTORY  Social History     Socioeconomic History   • Marital status: Single     Spouse name: Not on file   • Number of children: Not on file   • Years of education: Not on file   • Highest education level: Not on file   Occupational History   • Not on file   Tobacco Use   • Smoking status: Former Smoker     Packs/day: 0.50     Types: Cigarettes     Quit date: 2016     Years since quittin.3   • Smokeless tobacco: Never Used   Substance and Sexual Activity   • Alcohol use: Yes     Alcohol/week: 3.6 oz     Types: 6 Standard drinks or equivalent per week     Comment: occ   • Drug use: Yes     Comment: thc - edibles    • Sexual activity: Yes     Partners: Female     Birth control/protection: Male Sterilization   Other Topics Concern   •  Service No   • Blood Transfusions No   • Caffeine Concern No   • Occupational Exposure No   • Hobby Hazards No   • Sleep Concern No   • Stress Concern No   • Weight Concern No   • Special Diet No   • Back Care Yes     Comment: seeing chiropractor   • Exercise Yes   • Bike Helmet No   • Seat Belt Yes   • Self-Exams No   Social History Narrative   • Not on file     Social Determinants of Health     Financial Resource Strain:    • Difficulty of Paying Living Expenses:    Food Insecurity:    • Worried About Running Out of Food in the Last Year:    • Ran Out of Food in the Last Year:    Transportation Needs:    • Lack of Transportation (Medical):    • Lack of Transportation (Non-Medical):    Physical Activity:    • Days of Exercise per Week:    • Minutes of Exercise per Session:    Stress:    • Feeling of Stress :    Social Connections:    • Frequency of Communication with Friends and Family:    • Frequency of Social Gatherings with Friends and Family:    • Attends Mandaen Services:    • Active Member of Clubs or Organizations:    • Attends Club or Organization Meetings:    • Marital Status:    Intimate Partner Violence:    • Fear of Current or Ex-Partner:    • Emotionally Abused:    • Physically Abused:    • Sexually Abused:        SURGICAL HISTORY  Past Surgical History:   Procedure Laterality Date   • [place] NJX AA&/STRD TFRML EPI LUMBAR/SACRAL 1 LEVEL Left 10/20/2021    Procedure: LEFT lumbar five transforaminal epidural steroid injection;  Surgeon: James Camarillo M.D.;  [facility];  Service: Pain Management   • WRIST ORIF Right     with tendon and vessel repain       CURRENT MEDICATIONS  Home Medications     Reviewed by Nghia Saleem R.N. (Registered Nurse) on 10/24/21 at 1305  Med List Status: Not Addressed   Medication Last Dose Status   cyclobenzaprine  "(FLEXERIL) 10 mg Tab 10/24/2021 Active                ALLERGIES  No Known Allergies    PHYSICAL EXAM  VITAL SIGNS: /106   Pulse 78   Temp 36.8 °C (98.3 °F) (Temporal)   Resp 18   Ht 1.854 m (6' 1\")   Wt 92.5 kg (203 lb 14.8 oz)   SpO2 98%   BMI 26.90 kg/m²        Constitutional: Well developed, Well nourished, No acute distress, Non-toxic appearance.   Neck: Normal range of motion, No tenderness, Supple, No stridor.   Cardiovascular: Normal heart rate, Normal rhythm, No murmurs, No rubs, No gallops. No pulsatile masses.  Thorax & Lungs: Normal breath sounds, No respiratory distress, No wheezing, No chest tenderness.   Abdomen: Bowel sounds normal, Soft, No tenderness, No masses, No pulsatile masses.   Skin: Warm, Dry, No erythema, No rash.   Back: The patient does have a puncture wound from the epidural on Wednesday that is clean dry and intact without erythema.  He has palpable muscle spasm to his LS spine area.  Extremities: Intact distal pulses, No edema, No tenderness, No cyanosis, No clubbing.   Neurologic: Alert & oriented x 3, Normal motor function, Normal sensory function, No focal deficits noted.  Patient has equal strength and sensation bilateral lower extremities with normal DTRs and no clonus        COURSE & MEDICAL DECISION MAKING  Pertinent Labs & Imaging studies reviewed. (See chart for details)    On exam the patient has acute on chronic radicular back pain status post epidural without any signs of infection.  He has no cauda equina symptoms.  I will give him IM Dilaudid here and send him home with Norco.  He is to continue the muscle relaxants and anti-inflammatories as directed and talk to his pain management doctor tomorrow.  He is to return for any cauda equina symptoms or fevers.    I reviewed prescription monitoring program for patient's narcotic use before prescribing a scheduled drug.The patient will not drink alcohol nor drive with prescribed medications. The patient will return " for new or worsening symptoms and is stable at the time of discharge.    The patient is referred to a primary physician for blood pressure management, diabetic screening, and for all other preventative health concerns.    In prescribing controlled substances to this patient, I certify that I have obtained and reviewed the medical history of Ayo Reyna. I have also made a good huma effort to obtain applicable records from other providers who have treated the patient and records did not demonstrate any increased risk of substance abuse that would prevent me from prescribing controlled substances.     I have conducted a physical exam and documented it. I have reviewed Mr. Reyna’s prescription history as maintained by the [place] Prescription Monitoring Program.     I have assessed the patient’s risk for abuse, dependency, and addiction using the validated Opioid Risk Tool available at https://www.mdcalc.com/lyzsyw-itwa-otph-ort-narcotic-abuse.     Given the above, I believe the benefits of controlled substance therapy outweigh the risks. The reasons for prescribing controlled substances include non-narcotic, oral analgesic alternatives have been inadequate for pain control. Accordingly, I have discussed the risk and benefits, treatment plan, and alternative therapies with the patient.         DISPOSITION:  Patient will be discharged home in stable condition.    FOLLOW UP:  James Camarillo M.D.  [id number] [facility] [address]  [facility] [address] [phone]    Call in 1 day  for recheck      OUTPATIENT MEDICATIONS:  New Prescriptions    HYDROCODONE-ACETAMINOPHEN (NORCO) 5-325 MG TAB PER TABLET    Take 1 Tablet by mouth every 6 hours as needed for up to 3 days.         FINAL IMPRESSION  1. Lumbosacral radiculopathy          Electronically signed by: Chata Sheehan M.D., 10/24/2021 6:18 PM

## 2021-10-27 ENCOUNTER — OFFICE VISIT (OUTPATIENT)
Dept: PHYSICAL MEDICINE AND REHAB | Facility: MEDICAL CENTER | Age: 36
End: 2021-10-27
Payer: COMMERCIAL

## 2021-10-27 VITALS
DIASTOLIC BLOOD PRESSURE: 70 MMHG | WEIGHT: 202.38 LBS | OXYGEN SATURATION: 98 % | BODY MASS INDEX: 26.82 KG/M2 | HEIGHT: 73 IN | SYSTOLIC BLOOD PRESSURE: 124 MMHG | TEMPERATURE: 97.6 F | HEART RATE: 75 BPM

## 2021-10-27 DIAGNOSIS — M54.17 LUMBOSACRAL RADICULOPATHY: ICD-10-CM

## 2021-10-27 PROCEDURE — 99214 OFFICE O/P EST MOD 30 MIN: CPT | Performed by: PHYSICAL MEDICINE & REHABILITATION

## 2021-10-27 RX ORDER — METHYLPREDNISOLONE 4 MG/1
TABLET ORAL
Qty: 21 TABLET | Refills: 0 | Status: SHIPPED | OUTPATIENT
Start: 2021-10-27 | End: 2022-01-18

## 2021-10-27 RX ORDER — PREGABALIN 75 MG/1
75 CAPSULE ORAL 3 TIMES DAILY
Qty: 90 CAPSULE | Refills: 1 | Status: SHIPPED | OUTPATIENT
Start: 2021-10-27 | End: 2021-11-17 | Stop reason: SDUPTHER

## 2021-10-27 ASSESSMENT — PAIN SCALES - GENERAL: PAINLEVEL: 6=MODERATE PAIN

## 2021-10-27 ASSESSMENT — PATIENT HEALTH QUESTIONNAIRE - PHQ9: CLINICAL INTERPRETATION OF PHQ2 SCORE: 0

## 2021-10-27 NOTE — PATIENT INSTRUCTIONS
Lyrica  Start 75mg at bedtime for 2-3 days, then increase to 75mg twice a day for 3 days.    Then, can increase 75mg in morning and 150mg at bedtime

## 2021-10-27 NOTE — PROGRESS NOTES
Follow-up patient note    Physiatry (physical medicine and  Rehabilitation), interventional spine and sports medicine    Date of Service: 10/27/2021    Chief complaint:   Chief Complaint   Patient presents with   • Follow-Up     Back pain       HISTORY    HPI: Ayo Reyna 36 y.o. male who presents today for follow-up evaluation of low back pain.      Ayo returns for follow-up.  He reports that he had increased pain after left L5 TFESI on 10/20/2021.  He states that he felt pretty good initially, but pain seemed to increase.  Pain is radiating into the left leg.     He was seen at the ED on 10/24/2021. Pain medication helped.  Taking tylenol 1000mg po bid now.  Moving is easier now and he feels like the pain has started to reduce gradually, but pain still limiting.  No bowel or bladder changes.  Taking flexeril as needed.  Reports that he was given a script for norco, but is not taking it.  shows script for #10 norco 5/325    Prior to injection he had been having worsened pain with numbness left calf and foot.  This continues.      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       Medical records review:  I reviewed the note from the referring provider Katya Fregoso     Previous treatments:    Physical Therapy: Yes    Medications the patient is tried: NSAIDs    Previous interventions: none    Previous surgeries to relieve the above pain:  none      ROS:   Red Flags ROS:   Fever, Chills, Sweats: Denies  Involuntary Weight Loss: Denies  Bladder Incontinence: Denies  Bowel Incontinence: Denies  Saddle Anesthesia: Denies    All other systems reviewed and negative.       PMHx:   History reviewed. No pertinent past medical history.    PSHx:   Past Surgical History:   Procedure Laterality Date   • [place] NJX AA&/STRD TFRML EPI LUMBAR/SACRAL 1 LEVEL Left 10/20/2021    Procedure: LEFT lumbar five transforaminal epidural steroid injection;  Surgeon: James Camarillo M.D.;   Location[facility];  Service: Pain Management   • WRIST ORIF Right     with tendon and vessel repain       Family history   Family History   Family history unknown: Yes       Medications:   Current Outpatient Medications   Medication   • methylPREDNISolone (MEDROL DOSEPAK) 4 MG Tablet Therapy Pack   • pregabalin (LYRICA) 75 MG Cap   • cyclobenzaprine (FLEXERIL) 10 mg Tab     No current facility-administered medications for this visit.       Allergies:   No Known Allergies    Social Hx:   Social History     Socioeconomic History   • Marital status: Single     Spouse name: Not on file   • Number of children: Not on file   • Years of education: Not on file   • Highest education level: Not on file   Occupational History   • Not on file   Tobacco Use   • Smoking status: Former Smoker     Packs/day: 0.50     Types: Cigarettes     Quit date: 2016     Years since quittin.3   • Smokeless tobacco: Never Used   Substance and Sexual Activity   • Alcohol use: Yes     Alcohol/week: 3.6 oz     Types: 6 Standard drinks or equivalent per week     Comment: occ   • Drug use: Yes     Comment: thc - edibles   • Sexual activity: Yes     Partners: Female     Birth control/protection: Male Sterilization   Other Topics Concern   •  Service No   • Blood Transfusions No   • Caffeine Concern No   • Occupational Exposure No   • Hobby Hazards No   • Sleep Concern No   • Stress Concern No   • Weight Concern No   • Special Diet No   • Back Care Yes     Comment: seeing chiropractor   • Exercise Yes   • Bike Helmet No   • Seat Belt Yes   • Self-Exams No   Social History Narrative   • Not on file     Social Determinants of Health     Financial Resource Strain:    • Difficulty of Paying Living Expenses:    Food Insecurity:    • Worried About Running Out of Food in the Last Year:    • Ran Out of Food in the Last Year:    Transportation Needs:    • Lack of Transportation (Medical):    • Lack of Transportation  "(Non-Medical):    Physical Activity:    • Days of Exercise per Week:    • Minutes of Exercise per Session:    Stress:    • Feeling of Stress :    Social Connections:    • Frequency of Communication with Friends and Family:    • Frequency of Social Gatherings with Friends and Family:    • Attends Hoahaoism Services:    • Active Member of Clubs or Organizations:    • Attends Club or Organization Meetings:    • Marital Status:    Intimate Partner Violence:    • Fear of Current or Ex-Partner:    • Emotionally Abused:    • Physically Abused:    • Sexually Abused:          EXAMINATION     Physical Exam:   Vitals: /70 (BP Location: Right arm, Patient Position: Sitting, BP Cuff Size: Small adult)   Pulse 75   Temp 36.4 °C (97.6 °F) (Temporal)   Ht 1.854 m (6' 1\")   Wt 91.8 kg (202 lb 6.1 oz)   SpO2 98%     Constitutional:   Body Habitus: Body mass index is 26.7 kg/m².  Cooperation: Fully cooperates with exam  Appearance: Well-groomed, well-nourished, not disheveled, in no acute distress    Eyes: No scleral icterus, no proptosis     ENT -no obvious auditory deficits, wearing a face mask    Skin -no rashes or lesions noted, no warmth or erythema was noted at the injection site    Respiratory-  breathing comfortable on room air, no audible wheezing    Cardiovascular- No lower extremity edema is noted.     Psychiatric- alert and oriented ×3. Normal affect.     Gait - normal gait, no use of ambulatory device, antalgic    Musculoskeletal -     Thoracic/Lumbar Spine/Sacral Spine/Hips   Inspection: No evidence of atrophy in bilateral lower extremities throughout     ROM: full  AROM with flexion, extension, lateral flexion, and rotation bilaterally, with pain in extension left greater than right    Palpation:   No tenderness to palpation in midline at T1-T12 levels. No tenderness to palpation in the left and right of the midline T1-L5    Lumbar spine Special tests  Neuro tension  Seated straight leg test positive on the " left and negative on the right    Neuro     Key points for the international standards for neurological classification of spinal cord injury (ISNCSCI) to light touch.     Dermatome R L   L2 2 2   L3 2 2   L4 2 2   L5 2 1   S1 2 2   S2 2 2       Motor Exam Lower Extremities    ? Myotome R L   Hip flexion L2 5 5   Knee extension L3 5 4*   Ankle dorsiflexion L4 5 4+    Toe extension L5 5 5   Ankle plantarflexion S1 5 5   *pain limited    Reflexes  ?  R L   Patella  2+ 2+   Achilles   2+ 2+       MEDICAL DECISION MAKING    Medical records review: see under HPI section.     DATA    Labs:   No results found for: SODIUM, POTASSIUM, CHLORIDE, CO2, ANION, GLUCOSE, BUN, CREATININE, CALCIUM, ASTSGOT, ALTSGPT, TBILIRUBIN, ALBUMIN, TOTPROTEIN, GLOBULIN, AGRATIO]    No results found for: PROTHROMBTM, INR     No results found for: WBC, RBC, HEMOGLOBIN, HEMATOCRIT, MCV, MCH, MCHC, MPV, NEUTSPOLYS, LYMPHOCYTES, MONOCYTES, EOSINOPHILS, BASOPHILS, HYPOCHROMIA, ANISOCYTOSIS     No results found for: HBA1C     Imaging: I personally reviewed following images, these are my reads  MRI lumbar spine 09/13/2021  At L1-2, no central or foraminal stenosis  At L2-3, no central or foraminal stenosis  At L3-4, no central or foraminal stenosis  At L4-5, no central or foraminal stenosis, there is facet arthropathy  At L5-S1, left paracentral disc extrusion with moderate left lateral recess stenosis and negative foraminal stenosis bilaterally      IMAGING radiology reads. I reviewed the following radiology reads   MRI lumbar 09/13/2021     IMPRESSION:     1.  L5-S1 moderate-sized left paracentral/lateral disc extrusion moderately narrowing the left lateral recess and foramen  2.  No other significant finding  3.  Incidental right renal cyst  4.  No follow up imaging is recommended per consensus guidelines of the 2019 ACR Incidental Findings Committee for probably benign incidental simple appearing renal cystic lesion(s) based on imaging  criteria.      Xray lumbar spine 09/13/2021  IMPRESSION:     Negative lumbar spine series                                                                                            Diagnosis   Visit Diagnoses     ICD-10-CM   1. Lumbosacral radiculopathy  M54.17           ASSESSMENT:  Ayo Reyna 36 y.o. male seen for above     Ayo was seen today for follow-up.    Diagnoses and all orders for this visit:    Lumbosacral radiculopathy  -     methylPREDNISolone (MEDROL DOSEPAK) 4 MG Tablet Therapy Pack; As directed on the packaging label.  -     pregabalin (LYRICA) 75 MG Cap; Take 1 Capsule by mouth 3 times a day for 30 days.  -     REFERRAL TO NEUROSURGERY         1. Discussed worsening lumbar radiculopathy, aggravated after left L5 TFESI.    2. Medrol dose pack given.  Avoid taking NSAIDs.  Taking tylenol prn, less than 4000mg/day  3. Discussed trial of lyrica titrating to 75mg po AM and 150mg po qhs.  Will consider further titration depending on response and tolerance.  4. Referral to Neurosurgery placed for surgical consultation.   5. He is able to modify activity at work and will let me know if this changes.  He would like to hold off on extended time off work pending surgical consultation.  Discussed seeking care if symptoms worsen.        Follow-up: Return in about 3 weeks (around 11/17/2021).      Please note that this dictation was created using voice recognition software. I have made every reasonable attempt to correct obvious errors but there may be errors of grammar and content that I may have overlooked prior to finalization of this note.      James Camarillo MD  Physical Medicine and Rehabilitation  Interventional Spine and Sports Physiatry  [facility]

## 2021-11-02 ENCOUNTER — APPOINTMENT (OUTPATIENT)
Dept: PHYSICAL MEDICINE AND REHAB | Facility: MEDICAL CENTER | Age: 36
End: 2021-11-02
Payer: COMMERCIAL

## 2021-11-17 ENCOUNTER — OFFICE VISIT (OUTPATIENT)
Dept: PHYSICAL MEDICINE AND REHAB | Facility: MEDICAL CENTER | Age: 36
End: 2021-11-17
Payer: COMMERCIAL

## 2021-11-17 VITALS
TEMPERATURE: 97.4 F | WEIGHT: 199.74 LBS | OXYGEN SATURATION: 98 % | HEART RATE: 60 BPM | DIASTOLIC BLOOD PRESSURE: 70 MMHG | SYSTOLIC BLOOD PRESSURE: 128 MMHG | HEIGHT: 73 IN | BODY MASS INDEX: 26.47 KG/M2

## 2021-11-17 DIAGNOSIS — M54.17 LUMBOSACRAL RADICULOPATHY: ICD-10-CM

## 2021-11-17 PROCEDURE — 99213 OFFICE O/P EST LOW 20 MIN: CPT | Performed by: PHYSICAL MEDICINE & REHABILITATION

## 2021-11-17 RX ORDER — PREGABALIN 75 MG/1
75 CAPSULE ORAL 3 TIMES DAILY
Qty: 90 CAPSULE | Refills: 2 | Status: SHIPPED | OUTPATIENT
Start: 2021-11-17 | End: 2021-12-17

## 2021-11-17 ASSESSMENT — PAIN SCALES - GENERAL: PAINLEVEL: 3=SLIGHT PAIN

## 2021-11-17 ASSESSMENT — PATIENT HEALTH QUESTIONNAIRE - PHQ9: CLINICAL INTERPRETATION OF PHQ2 SCORE: 0

## 2021-11-17 NOTE — PROGRESS NOTES
Follow-up patient note    Physiatry (physical medicine and  Rehabilitation), interventional spine and sports medicine    Date of Service: 11/17/2021    Chief complaint:   Chief Complaint   Patient presents with   • Follow-Up     Back pain       HISTORY    HPI: Ayo Reyna 36 y.o. male who presents today for follow-up evaluation of low back pain.      Pain is not as bad, but he still has pain in the left foot, some numbness and pain.  This is intermittent, much improved.  Sleep is improved.  He is not taking flexeril.  Pain is now a 3/10 on the NRS.    He took the medrol dose pack.  Taking lyrica 75mg po tid. Tolerating this without significant side effects.  Some initial fatigue, which is decreasing.    Taking tylenol for pain, 1000mg in the morning and occasionally a second time.  No bowel or bladder changes.     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  His boss is being supportive of this.       Medical records review:  I reviewed the note from the referring provider Katya Fregoso     Previous treatments:    Physical Therapy: Yes    Medications the patient is tried: NSAIDs    Previous interventions: none    Previous surgeries to relieve the above pain:  none      ROS:   Red Flags ROS:   Fever, Chills, Sweats: Denies  Involuntary Weight Loss: Denies  Bladder Incontinence: Denies  Bowel Incontinence: Denies  Saddle Anesthesia: Denies    All other systems reviewed and negative.       PMHx:   History reviewed. No pertinent past medical history.    PSHx:   Past Surgical History:   Procedure Laterality Date   • RI NJX AA&/STRD TFRML EPI LUMBAR/SACRAL 1 LEVEL Left 10/20/2021    Procedure: LEFT lumbar five transforaminal epidural steroid injection;  Surgeon: James Camarillo M.D.;  [facility];  Service: Pain Management   • WRIST ORIF Right     with tendon and vessel repain       Family history   Family History    Family history unknown: Yes       Medications:   Current Outpatient Medications   Medication   • pregabalin (LYRICA) 75 MG Cap   • cyclobenzaprine (FLEXERIL) 10 mg Tab   • methylPREDNISolone (MEDROL DOSEPAK) 4 MG Tablet Therapy Pack     No current facility-administered medications for this visit.       Allergies:   No Known Allergies    Social Hx:   Social History     Socioeconomic History   • Marital status: Single     Spouse name: Not on file   • Number of children: Not on file   • Years of education: Not on file   • Highest education level: Not on file   Occupational History   • Not on file   Tobacco Use   • Smoking status: Former Smoker     Packs/day: 0.50     Types: Cigarettes     Quit date: 2016     Years since quittin.3   • Smokeless tobacco: Never Used   Substance and Sexual Activity   • Alcohol use: Yes     Alcohol/week: 3.6 oz     Types: 6 Standard drinks or equivalent per week     Comment: occ   • Drug use: Yes     Types: Marijuana     Comment: thc - edibles   • Sexual activity: Yes     Partners: Female     Birth control/protection: Male Sterilization   Other Topics Concern   •  Service No   • Blood Transfusions No   • Caffeine Concern No   • Occupational Exposure No   • Hobby Hazards No   • Sleep Concern No   • Stress Concern No   • Weight Concern No   • Special Diet No   • Back Care Yes     Comment: seeing chiropractor   • Exercise Yes   • Bike Helmet No   • Seat Belt Yes   • Self-Exams No   Social History Narrative   • Not on file     Social Determinants of Health     Financial Resource Strain:    • Difficulty of Paying Living Expenses: Not on file   Food Insecurity:    • Worried About Running Out of Food in the Last Year: Not on file   • Ran Out of Food in the Last Year: Not on file   Transportation Needs:    • Lack of Transportation (Medical): Not on file   • Lack of Transportation (Non-Medical): Not on file   Physical Activity:    • Days of Exercise per Week: Not on file   •  "Minutes of Exercise per Session: Not on file   Stress:    • Feeling of Stress : Not on file   Social Connections:    • Frequency of Communication with Friends and Family: Not on file   • Frequency of Social Gatherings with Friends and Family: Not on file   • Attends [employer] Services: Not on file   • Active Member of Clubs or Organizations: Not on file   • Attends Club or Organization Meetings: Not on file   • Marital Status: Not on file   Intimate Partner Violence:    • Fear of Current or Ex-Partner: Not on file   • Emotionally Abused: Not on file   • Physically Abused: Not on file   • Sexually Abused: Not on file   Housing Stability:    • Unable to Pay for Housing in the Last Year: Not on file   • Number of Places Lived in the Last Year: Not on file   • Unstable Housing in the Last Year: Not on file         EXAMINATION     Physical Exam:   Vitals: /70 (BP Location: Right arm, Patient Position: Sitting, BP Cuff Size: Small adult)   Pulse 60   Temp 36.3 °C (97.4 °F) (Temporal)   Ht 1.854 m (6' 1\")   Wt 90.6 kg (199 lb 11.8 oz)   SpO2 98%     Constitutional:   Body Habitus: Body mass index is 26.35 kg/m².  Cooperation: Fully cooperates with exam  Appearance: Well-groomed, well-nourished, not disheveled, in no acute distress    Eyes: No scleral icterus, no proptosis     ENT -no obvious auditory deficits, wearing a face mask    Skin -no rashes or lesions noted, no warmth or erythema was noted at the injection site    Respiratory-  breathing comfortable on room air, no audible wheezing    Cardiovascular- No lower extremity edema is noted.     Psychiatric- alert and oriented ×3. Normal affect.     Gait - normal gait, no use of ambulatory device, minimally antalgic    Musculoskeletal -     Thoracic/Lumbar Spine/Sacral Spine/Hips   Inspection: No evidence of atrophy in bilateral lower extremities throughout     ROM: full  AROM with flexion, extension, lateral flexion, and rotation bilaterally, with pain in " extension left greater than right    Lumbar spine Special tests  Neuro tension  Seated straight leg test positive on the left and negative on the right    Neuro     Key points for the international standards for neurological classification of spinal cord injury (ISNCSCI) to light touch.     Dermatome R L   L2 2 2   L3 2 2   L4 2 2   L5 2 1   S1 2 2   S2 2 2       Motor Exam Lower Extremities    ? Myotome R L   Hip flexion L2 5 5   Knee extension L3 5 5-   Ankle dorsiflexion L4 5 4+    Toe extension L5 5 5   Ankle plantarflexion S1 5 5        Reflexes  ?  R L   Patella  2+ 2+   Achilles   2+ 2+       MEDICAL DECISION MAKING    Medical records review: see under HPI section.     DATA    Labs:   No results found for: SODIUM, POTASSIUM, CHLORIDE, CO2, ANION, GLUCOSE, BUN, CREATININE, CALCIUM, ASTSGOT, ALTSGPT, TBILIRUBIN, ALBUMIN, TOTPROTEIN, GLOBULIN, AGRATIO]    No results found for: PROTHROMBTM, INR     No results found for: WBC, RBC, HEMOGLOBIN, HEMATOCRIT, MCV, MCH, MCHC, MPV, NEUTSPOLYS, LYMPHOCYTES, MONOCYTES, EOSINOPHILS, BASOPHILS, HYPOCHROMIA, ANISOCYTOSIS     No results found for: HBA1C     Imaging: I personally reviewed following images, these are my reads  MRI lumbar spine 09/13/2021  At L1-2, no central or foraminal stenosis  At L2-3, no central or foraminal stenosis  At L3-4, no central or foraminal stenosis  At L4-5, no central or foraminal stenosis, there is facet arthropathy  At L5-S1, left paracentral disc extrusion with moderate left lateral recess stenosis and negative foraminal stenosis bilaterally      IMAGING radiology reads. I reviewed the following radiology reads   MRI lumbar 09/13/2021     IMPRESSION:     1.  L5-S1 moderate-sized left paracentral/lateral disc extrusion moderately narrowing the left lateral recess and foramen  2.  No other significant finding  3.  Incidental right renal cyst  4.  No follow up imaging is recommended per consensus guidelines of the 2019 ACR Incidental Findings  Committee for probably benign incidental simple appearing renal cystic lesion(s) based on imaging criteria.      Xray lumbar spine 09/13/2021  IMPRESSION:     Negative lumbar spine series                                                                                            Diagnosis   Visit Diagnoses     ICD-10-CM   1. Lumbosacral radiculopathy  M54.17           ASSESSMENT:  Ayo Reyna 36 y.o. male seen for above     Ayo was seen today for follow-up.    Diagnoses and all orders for this visit:    Lumbosacral radiculopathy  -     pregabalin (LYRICA) 75 MG Cap; Take 1 Capsule by mouth 3 times a day for 30 days.       1. Doing better with decrease in pain to 3/10 on the NRS.  Continues to have paresthesias in the left foot, which fluctuates.  Exam is stable.  2. Reprinted Neurosurgical consultation.  3. Continue tylenol, less than 4000mg/day, taking about 2000mg.  4. Continue lyrica 75mg po tid.  Refills given.  Avoid drinking alcohol.  5. Discussed possible referral to PT, hold for now, plan to reassess at next visit and plan to progress as he is able.  6. Continue limiting lifting at work.      Follow-up: Return in about 2 months (around 1/17/2022).      Please note that this dictation was created using voice recognition software. I have made every reasonable attempt to correct obvious errors but there may be errors of grammar and content that I may have overlooked prior to finalization of this note.      James Camarillo MD  Physical Medicine and Rehabilitation  Interventional Spine and Sports Physiatry  [facility]

## 2022-01-18 ENCOUNTER — OFFICE VISIT (OUTPATIENT)
Dept: PHYSICAL MEDICINE AND REHAB | Facility: MEDICAL CENTER | Age: 37
End: 2022-01-18
Payer: COMMERCIAL

## 2022-01-18 VITALS
OXYGEN SATURATION: 96 % | BODY MASS INDEX: 25.3 KG/M2 | DIASTOLIC BLOOD PRESSURE: 62 MMHG | WEIGHT: 190.92 LBS | TEMPERATURE: 98.4 F | HEIGHT: 73 IN | SYSTOLIC BLOOD PRESSURE: 128 MMHG | HEART RATE: 66 BPM

## 2022-01-18 DIAGNOSIS — M54.17 LUMBOSACRAL RADICULOPATHY: ICD-10-CM

## 2022-01-18 PROCEDURE — 99213 OFFICE O/P EST LOW 20 MIN: CPT | Performed by: PHYSICAL MEDICINE & REHABILITATION

## 2022-01-18 ASSESSMENT — PAIN SCALES - GENERAL: PAINLEVEL: 4=SLIGHT-MODERATE PAIN

## 2022-01-18 ASSESSMENT — PATIENT HEALTH QUESTIONNAIRE - PHQ9: CLINICAL INTERPRETATION OF PHQ2 SCORE: 0

## 2022-01-18 NOTE — PROGRESS NOTES
Follow-up patient note    Physiatry (physical medicine and  Rehabilitation), interventional spine and sports medicine    Date of Service: 01/18/2022    Chief complaint:   Chief Complaint   Patient presents with   • Follow-Up     Lower back pain       HISTORY    HPI: Ayo Reyna 36 y.o. male who presents today for follow-up evaluation of low back pain.      Since the last visit, he was referred to Neurosurgery, but this did not happen.  Not sure what happened.  Referred on 10/28/2021    He has had to do some lifting at work, but has been able to limit this.  He is cautious about lifting as it can aggravate symptoms.      Pain is about 4/10 on the NRS today.  It is constant, but significantly better.  He is sleeping through the night.  No bowel or bladder changes.      Taking tylenol for pain, less than once a week.  Rarely takes flexeril.  No bowel or bladder changes.  Not taking lyrica.  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  His boss is being supportive of this.       Medical records review:  I reviewed the note from the referring provider Katya Fregoso     Previous treatments:    Physical Therapy: Yes    Medications the patient is tried: NSAIDs    Previous interventions: none    Previous surgeries to relieve the above pain:  none      ROS:   Red Flags ROS:   Fever, Chills, Sweats: Denies  Involuntary Weight Loss: Denies  Bladder Incontinence: Denies  Bowel Incontinence: Denies  Saddle Anesthesia: Denies    All other systems reviewed and negative.       PMHx:   History reviewed. No pertinent past medical history.    PSHx:   Past Surgical History:   Procedure Laterality Date   • [place] NJX AA&/STRD TFRML EPI LUMBAR/SACRAL 1 LEVEL Left 10/20/2021    Procedure: LEFT lumbar five transforaminal epidural steroid injection;  Surgeon: James Camarillo M.D.;  [facility];  Service: Pain Management   • ORIF, WRIST Right      with tendon and vessel repain       Family history   Family History   Family history unknown: Yes       Medications:   Current Outpatient Medications   Medication   • cyclobenzaprine (FLEXERIL) 10 mg Tab     No current facility-administered medications for this visit.       Allergies:   No Known Allergies    Social Hx:   Social History     Socioeconomic History   • Marital status: Single     Spouse name: Not on file   • Number of children: Not on file   • Years of education: Not on file   • Highest education level: Not on file   Occupational History   • Not on file   Tobacco Use   • Smoking status: Former Smoker     Packs/day: 0.50     Types: Cigarettes     Quit date: 2016     Years since quittin.5   • Smokeless tobacco: Never Used   Substance and Sexual Activity   • Alcohol use: Yes     Alcohol/week: 3.6 oz     Types: 6 Standard drinks or equivalent per week     Comment: occ   • Drug use: Yes     Types: Marijuana     Comment: thc - edibles   • Sexual activity: Yes     Partners: Female     Birth control/protection: Male Sterilization   Other Topics Concern   •  Service No   • Blood Transfusions No   • Caffeine Concern No   • Occupational Exposure No   • Hobby Hazards No   • Sleep Concern No   • Stress Concern No   • Weight Concern No   • Special Diet No   • Back Care Yes     Comment: seeing chiropractor   • Exercise Yes   • Bike Helmet No   • Seat Belt Yes   • Self-Exams No   Social History Narrative   • Not on file     Social Determinants of Health     Financial Resource Strain:    • Difficulty of Paying Living Expenses: Not on file   Food Insecurity:    • Worried About Running Out of Food in the Last Year: Not on file   • Ran Out of Food in the Last Year: Not on file   Transportation Needs:    • Lack of Transportation (Medical): Not on file   • Lack of Transportation (Non-Medical): Not on file   Physical Activity:    • Days of Exercise per Week: Not on file   • Minutes of Exercise per Session: Not  "on file   Stress:    • Feeling of Stress : Not on file   Social Connections:    • Frequency of Communication with Friends and Family: Not on file   • Frequency of Social Gatherings with Friends and Family: Not on file   • Attends [employer] Services: Not on file   • Active Member of Clubs or Organizations: Not on file   • Attends Club or Organization Meetings: Not on file   • Marital Status: Not on file   Intimate Partner Violence:    • Fear of Current or Ex-Partner: Not on file   • Emotionally Abused: Not on file   • Physically Abused: Not on file   • Sexually Abused: Not on file   Housing Stability:    • Unable to Pay for Housing in the Last Year: Not on file   • Number of Places Lived in the Last Year: Not on file   • Unstable Housing in the Last Year: Not on file         EXAMINATION     Physical Exam:   Vitals: /62 (BP Location: Right arm, Patient Position: Sitting, BP Cuff Size: Small adult)   Pulse 66   Temp 36.9 °C (98.4 °F) (Temporal)   Ht 1.854 m (6' 1\")   Wt 86.6 kg (190 lb 14.7 oz)   SpO2 96%     Constitutional:   Body Habitus: Body mass index is 25.19 kg/m².  Cooperation: Fully cooperates with exam  Appearance: Well-groomed, well-nourished, not disheveled, in no acute distress    Eyes: No scleral icterus, no proptosis     ENT -no obvious auditory deficits, wearing a face mask    Skin -no rashes or lesions noted    Respiratory-  breathing comfortable on room air, no audible wheezing    Cardiovascular- No lower extremity edema is noted.     Psychiatric- alert and oriented ×3. Normal affect.     Gait - normal gait, no use of ambulatory device, nonantalgic    Musculoskeletal -     Thoracic/Lumbar Spine/Sacral Spine/Hips   Inspection: No evidence of atrophy in bilateral lower extremities throughout     ROM: full  AROM with flexion, extension, lateral flexion, and rotation bilaterally, without pain    Ten single leg toe raises slower and poorer quality on the left    Lumbar spine Special tests  Neuro " tension  Seated straight leg test mildly positive on the left and negative on the right    Neuro     Key points for the international standards for neurological classification of spinal cord injury (ISNCSCI) to light touch.     Dermatome R L   L2 2 2   L3 2 2   L4 2 2   L5 2 2   S1 2 2   S2 2 2       Motor Exam Lower Extremities    ? Myotome R L   Hip flexion L2 5 5   Knee extension L3 5 5-   Ankle dorsiflexion L4 5 5-   Toe extension L5 5 5   Ankle plantarflexion S1 5 5        Reflexes  ?  R L   Patella  2+ 2+   Achilles   2+ 2+       MEDICAL DECISION MAKING    Medical records review: see under HPI section.     DATA    Labs:   No results found for: SODIUM, POTASSIUM, CHLORIDE, CO2, ANION, GLUCOSE, BUN, CREATININE, CALCIUM, ASTSGOT, ALTSGPT, TBILIRUBIN, ALBUMIN, TOTPROTEIN, GLOBULIN, AGRATIO]    No results found for: PROTHROMBTM, INR     No results found for: WBC, RBC, HEMOGLOBIN, HEMATOCRIT, MCV, MCH, MCHC, MPV, NEUTSPOLYS, LYMPHOCYTES, MONOCYTES, EOSINOPHILS, BASOPHILS, HYPOCHROMIA, ANISOCYTOSIS     No results found for: HBA1C     Imaging: I personally reviewed following images, these are my reads  MRI lumbar spine 09/13/2021  At L1-2, no central or foraminal stenosis  At L2-3, no central or foraminal stenosis  At L3-4, no central or foraminal stenosis  At L4-5, no central or foraminal stenosis, there is facet arthropathy  At L5-S1, left paracentral disc extrusion with moderate left lateral recess stenosis and negative foraminal stenosis bilaterally      IMAGING radiology reads. I reviewed the following radiology reads   MRI lumbar 09/13/2021     IMPRESSION:     1.  L5-S1 moderate-sized left paracentral/lateral disc extrusion moderately narrowing the left lateral recess and foramen  2.  No other significant finding  3.  Incidental right renal cyst  4.  No follow up imaging is recommended per consensus guidelines of the 2019 ACR Incidental Findings Committee for probably benign incidental simple appearing renal  cystic lesion(s) based on imaging criteria.      Xray lumbar spine 09/13/2021  IMPRESSION:     Negative lumbar spine series                                                                                            Diagnosis   Visit Diagnoses     ICD-10-CM   1. Lumbosacral radiculopathy  M54.17           ASSESSMENT:  Ayo Reyna 36 y.o. male seen for above     Ayo was seen today for follow-up.    Diagnoses and all orders for this visit:    Lumbosacral radiculopathy  -     Referral to Neurosurgery  -     Referral to Physical Therapy         1. Refer to physical therapy.  He is going better and will likely tolerate PT at this time.  2. Plan to refer back to Dr. Bray and if symptoms worsen with physical therapy and advancing, can consider surgical management.  Otherwise, pain has significantly improved.   3. Discontinue lyrica, he is has not been taking this.  Rarely taking flexeril.  Tylenol once a week.  4. Continue limiting lifting at work.      Follow-up: Return in about 2 months (around 3/18/2022).    My total time spent caring for the patient on the day of the encounter was 20 minutes.     Please note that this dictation was created using voice recognition software. I have made every reasonable attempt to correct obvious errors but there may be errors of grammar and content that I may have overlooked prior to finalization of this note.      James Camarillo MD  Physical Medicine and Rehabilitation  Interventional Spine and Sports Physiatry  [facility]

## 2022-04-01 ENCOUNTER — APPOINTMENT (OUTPATIENT)
Dept: PHYSICAL MEDICINE AND REHAB | Facility: MEDICAL CENTER | Age: 37
End: 2022-04-01
Payer: COMMERCIAL

## 2022-05-03 ENCOUNTER — OFFICE VISIT (OUTPATIENT)
Dept: PHYSICAL MEDICINE AND REHAB | Facility: MEDICAL CENTER | Age: 37
End: 2022-05-03
Payer: COMMERCIAL

## 2022-05-03 VITALS
SYSTOLIC BLOOD PRESSURE: 128 MMHG | BODY MASS INDEX: 26.3 KG/M2 | WEIGHT: 198.41 LBS | OXYGEN SATURATION: 95 % | HEIGHT: 73 IN | TEMPERATURE: 97 F | DIASTOLIC BLOOD PRESSURE: 62 MMHG | HEART RATE: 67 BPM

## 2022-05-03 DIAGNOSIS — M54.17 LUMBOSACRAL RADICULOPATHY: ICD-10-CM

## 2022-05-03 PROCEDURE — 99214 OFFICE O/P EST MOD 30 MIN: CPT | Performed by: PHYSICAL MEDICINE & REHABILITATION

## 2022-05-03 ASSESSMENT — PAIN SCALES - GENERAL: PAINLEVEL: 4=SLIGHT-MODERATE PAIN

## 2022-05-03 ASSESSMENT — PATIENT HEALTH QUESTIONNAIRE - PHQ9: CLINICAL INTERPRETATION OF PHQ2 SCORE: 0

## 2022-05-03 NOTE — PROGRESS NOTES
Follow-up patient note    Physiatry (physical medicine and  Rehabilitation), interventional spine and sports medicine    Date of Service: 05/03/2022    Chief complaint:   Chief Complaint   Patient presents with   • Follow-Up     Lower back pain       HISTORY    HPI: Ayo Reyna 37 y.o. male who presents today for follow-up evaluation of low back pain.      Since last visit Ayo has visited Dr. Bray's office.  From what he reports Dr. Bray has recommended surgical management.  Unfortunately I do not have Dr. Bray's report to review today.  Ayo reports that he is planning on surgery but they do not have a date set yet.    He continues to feel that his pain is about a 5 out of 10 on the NRS.  This is significantly improved but still constant pain.  He has had 6 weeks of physical therapy and feels he has had some benefit but he is still significantly limited in his activities.  He has continued to limit lifting.  No bowel or bladder changes.    He does have Flexeril at home although he has rarely taken this.  Tylenol for pain is very rare.  He did not find Lyrica to be beneficial.    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  His boss is being supportive of this.       Medical records review:  I reviewed the note from the referring provider Katya Fregoso     Previous treatments:    Physical Therapy: Yes    Medications the patient is tried: NSAIDs    Previous interventions: none    Previous surgeries to relieve the above pain:  none      ROS:   Red Flags ROS:   Fever, Chills, Sweats: Denies  Involuntary Weight Loss: Denies  Bladder Incontinence: Denies  Bowel Incontinence: Denies  Saddle Anesthesia: Denies    All other systems reviewed and negative.       PMHx:   History reviewed. No pertinent past medical history.    PSHx:   Past Surgical History:   Procedure Laterality Date   • SC NJX AA&/STRD TFRML EPI LUMBAR/SACRAL 1 LEVEL Left 10/20/2021    Procedure: LEFT lumbar  five transforaminal epidural steroid injection;  Surgeon: James Camarillo M.D.;  [facility];  Service: Pain Management   • ORIF, WRIST Right     with tendon and vessel repain       Family history   Family History   Family history unknown: Yes       Medications:   Current Outpatient Medications   Medication   • cyclobenzaprine (FLEXERIL) 10 mg Tab     No current facility-administered medications for this visit.       Allergies:   No Known Allergies    Social Hx:   Social History     Socioeconomic History   • Marital status: Single     Spouse name: Not on file   • Number of children: Not on file   • Years of education: Not on file   • Highest education level: Not on file   Occupational History   • Not on file   Tobacco Use   • Smoking status: Former Smoker     Packs/day: 0.50     Types: Cigarettes     Quit date: 2016     Years since quittin.8   • Smokeless tobacco: Never Used   Substance and Sexual Activity   • Alcohol use: Yes     Alcohol/week: 3.6 oz     Types: 6 Standard drinks or equivalent per week     Comment: occ   • Drug use: Yes     Types: Marijuana     Comment: thc - edibles   • Sexual activity: Yes     Partners: Female     Birth control/protection: Male Sterilization   Other Topics Concern   •  Service No   • Blood Transfusions No   • Caffeine Concern No   • Occupational Exposure No   • Hobby Hazards No   • Sleep Concern No   • Stress Concern No   • Weight Concern No   • Special Diet No   • Back Care Yes     Comment: seeing chiropractor   • Exercise Yes   • Bike Helmet No   • Seat Belt Yes   • Self-Exams No   Social History Narrative   • Not on file     Social Determinants of Health     Financial Resource Strain: Not on file   Food Insecurity: Not on file   Transportation Needs: Not on file   Physical Activity: Not on file   Stress: Not on file   Social Connections: Not on file   Intimate Partner Violence: Not on file   Housing Stability: Not on file  "        EXAMINATION     Physical Exam:   Vitals: /62 (BP Location: Right arm, Patient Position: Sitting, BP Cuff Size: Large adult)   Pulse 67   Temp 36.1 °C (97 °F) (Temporal)   Ht 1.854 m (6' 1\")   Wt 90 kg (198 lb 6.6 oz)   SpO2 95%     Constitutional:   Body Habitus: Body mass index is 26.18 kg/m².  Cooperation: Fully cooperates with exam  Appearance: Well-groomed, well-nourished, not disheveled, in no acute distress    Eyes: No scleral icterus, no proptosis     ENT -no obvious auditory deficits, wearing a face mask    Skin -no rashes or lesions noted    Respiratory-  breathing comfortable on room air, no audible wheezing    Cardiovascular- No lower extremity edema is noted.     Psychiatric- alert and oriented ×3. Normal affect.     Gait - normal gait, no use of ambulatory device, nonantalgic    Musculoskeletal -     Thoracic/Lumbar Spine/Sacral Spine/Hips   Inspection: No evidence of atrophy in bilateral lower extremities throughout     ROM: full  AROM with flexion, extension, lateral flexion, and rotation bilaterally, without pain    Ten single leg toe raises slower and poorer quality on the left    Lumbar spine Special tests  Neuro tension  Seated straight leg test mildly positive on the left and negative on the right    Neuro     Key points for the international standards for neurological classification of spinal cord injury (ISNCSCI) to light touch.     Dermatome R L   L2 2 2   L3 2 2   L4 2 2   L5 2 2   S1 2 2   S2 2 2       Motor Exam Lower Extremities    ? Myotome R L   Hip flexion L2 5 5   Knee extension L3 5 5-   Ankle dorsiflexion L4 5 4+   Toe extension L5 5 4+   Ankle plantarflexion S1 5 5        Reflexes  ?  R L   Patella  2+ 2+   Achilles   2+ 1+       MEDICAL DECISION MAKING    Medical records review: see under HPI section.     DATA    Labs:   No results found for: SODIUM, POTASSIUM, CHLORIDE, CO2, ANION, GLUCOSE, BUN, CREATININE, CALCIUM, ASTSGOT, ALTSGPT, TBILIRUBIN, ALBUMIN, " TOTPROTEIN, GLOBULIN, AGRATIO]    No results found for: PROTHROMBTM, INR     No results found for: WBC, RBC, HEMOGLOBIN, HEMATOCRIT, MCV, MCH, MCHC, MPV, NEUTSPOLYS, LYMPHOCYTES, MONOCYTES, EOSINOPHILS, BASOPHILS, HYPOCHROMIA, ANISOCYTOSIS     No results found for: HBA1C     Imaging: I personally reviewed following images, these are my reads  MRI lumbar spine 09/13/2021  At L1-2, no central or foraminal stenosis  At L2-3, no central or foraminal stenosis  At L3-4, no central or foraminal stenosis  At L4-5, no central or foraminal stenosis, there is facet arthropathy  At L5-S1, left paracentral disc extrusion with moderate left lateral recess stenosis and negative foraminal stenosis bilaterally      IMAGING radiology reads. I reviewed the following radiology reads   MRI lumbar 09/13/2021     IMPRESSION:     1.  L5-S1 moderate-sized left paracentral/lateral disc extrusion moderately narrowing the left lateral recess and foramen  2.  No other significant finding  3.  Incidental right renal cyst  4.  No follow up imaging is recommended per consensus guidelines of the 2019 ACR Incidental Findings Committee for probably benign incidental simple appearing renal cystic lesion(s) based on imaging criteria.      Xray lumbar spine 09/13/2021  IMPRESSION:     Negative lumbar spine series                                                                                            Diagnosis   Visit Diagnoses     ICD-10-CM   1. Lumbosacral radiculopathy  M54.17           ASSESSMENT:  Ayo Reyna 37 y.o. male seen for above     Ayo was seen today for follow-up.    Diagnoses and all orders for this visit:    Lumbosacral radiculopathy         1.  Plan to request report from Dr. Bray's visit.  At this time Eyal plan to proceed with surgical management and given the significant ongoing limitations this seems a reasonable choice.  2.  Encouraged him to continue with his home program from physical therapy.  3.  He is not  regularly taking over-the-counter medications.  He does have Flexeril that he has taken rarely.  He does not need a refill at this time.  4. Continue limiting lifting at work.      Follow-up: Return if symptoms worsen or fail to improve.       Please note that this dictation was created using voice recognition software. I have made every reasonable attempt to correct obvious errors but there may be errors of grammar and content that I may have overlooked prior to finalization of this note.      James Camarillo MD  Physical Medicine and Rehabilitation  Interventional Spine and Sports Physiatry  [facility]

## 2022-05-16 ENCOUNTER — APPOINTMENT (OUTPATIENT)
Dept: RADIOLOGY | Facility: MEDICAL CENTER | Age: 37
End: 2022-05-16
Attending: STUDENT IN AN ORGANIZED HEALTH CARE EDUCATION/TRAINING PROGRAM
Payer: COMMERCIAL

## 2022-05-17 ENCOUNTER — HOSPITAL ENCOUNTER (OUTPATIENT)
Dept: RADIOLOGY | Facility: MEDICAL CENTER | Age: 37
End: 2022-05-17
Attending: NEUROLOGICAL SURGERY
Payer: COMMERCIAL

## 2022-05-17 ENCOUNTER — HOSPITAL ENCOUNTER (OUTPATIENT)
Dept: RADIOLOGY | Facility: MEDICAL CENTER | Age: 37
End: 2022-05-17
Attending: STUDENT IN AN ORGANIZED HEALTH CARE EDUCATION/TRAINING PROGRAM
Payer: COMMERCIAL

## 2022-05-17 ENCOUNTER — HOSPITAL ENCOUNTER (OUTPATIENT)
Dept: LAB | Facility: MEDICAL CENTER | Age: 37
End: 2022-05-17
Attending: NEUROLOGICAL SURGERY
Payer: COMMERCIAL

## 2022-05-17 ENCOUNTER — APPOINTMENT (OUTPATIENT)
Dept: URGENT CARE | Facility: PHYSICIAN GROUP | Age: 37
End: 2022-05-17
Payer: COMMERCIAL

## 2022-05-17 DIAGNOSIS — Z01.810 PRE-OPERATIVE CARDIOVASCULAR EXAMINATION: ICD-10-CM

## 2022-05-17 DIAGNOSIS — Z01.811 PRE-OPERATIVE RESPIRATORY EXAMINATION: ICD-10-CM

## 2022-05-17 DIAGNOSIS — Z01.812 PRE-OPERATIVE LABORATORY EXAMINATION: ICD-10-CM

## 2022-05-17 DIAGNOSIS — Z01.818 PREOP EXAMINATION: ICD-10-CM

## 2022-05-17 DIAGNOSIS — M54.50 LOW BACK PAIN, UNSPECIFIED BACK PAIN LATERALITY, UNSPECIFIED CHRONICITY, UNSPECIFIED WHETHER SCIATICA PRESENT: ICD-10-CM

## 2022-05-17 LAB
ANION GAP SERPL CALC-SCNC: 10 MMOL/L (ref 7–16)
APTT PPP: 28.4 SEC (ref 24.7–36)
BASOPHILS # BLD AUTO: 1 % (ref 0–1.8)
BASOPHILS # BLD: 0.06 K/UL (ref 0–0.12)
BUN SERPL-MCNC: 17 MG/DL (ref 8–22)
CALCIUM SERPL-MCNC: 9.3 MG/DL (ref 8.5–10.5)
CHLORIDE SERPL-SCNC: 105 MMOL/L (ref 96–112)
CO2 SERPL-SCNC: 24 MMOL/L (ref 20–33)
CREAT SERPL-MCNC: 0.91 MG/DL (ref 0.5–1.4)
EOSINOPHIL # BLD AUTO: 0.54 K/UL (ref 0–0.51)
EOSINOPHIL NFR BLD: 9.2 % (ref 0–6.9)
ERYTHROCYTE [DISTWIDTH] IN BLOOD BY AUTOMATED COUNT: 44.6 FL (ref 35.9–50)
GFR SERPLBLD CREATININE-BSD FMLA CKD-EPI: 110 ML/MIN/1.73 M 2
GLUCOSE SERPL-MCNC: 92 MG/DL (ref 65–99)
HCT VFR BLD AUTO: 44.4 % (ref 42–52)
HGB BLD-MCNC: 15.3 G/DL (ref 14–18)
IMM GRANULOCYTES # BLD AUTO: 0.02 K/UL (ref 0–0.11)
IMM GRANULOCYTES NFR BLD AUTO: 0.3 % (ref 0–0.9)
INR PPP: 0.98 (ref 0.87–1.13)
LYMPHOCYTES # BLD AUTO: 1.92 K/UL (ref 1–4.8)
LYMPHOCYTES NFR BLD: 32.9 % (ref 22–41)
MCH RBC QN AUTO: 32.2 PG (ref 27–33)
MCHC RBC AUTO-ENTMCNC: 34.5 G/DL (ref 33.7–35.3)
MCV RBC AUTO: 93.5 FL (ref 81.4–97.8)
MONOCYTES # BLD AUTO: 0.55 K/UL (ref 0–0.85)
MONOCYTES NFR BLD AUTO: 9.4 % (ref 0–13.4)
NEUTROPHILS # BLD AUTO: 2.75 K/UL (ref 1.82–7.42)
NEUTROPHILS NFR BLD: 47.2 % (ref 44–72)
NRBC # BLD AUTO: 0 K/UL
NRBC BLD-RTO: 0 /100 WBC
PLATELET # BLD AUTO: 309 K/UL (ref 164–446)
PMV BLD AUTO: 9.6 FL (ref 9–12.9)
POTASSIUM SERPL-SCNC: 4.4 MMOL/L (ref 3.6–5.5)
PROTHROMBIN TIME: 12.7 SEC (ref 12–14.6)
RBC # BLD AUTO: 4.75 M/UL (ref 4.7–6.1)
SODIUM SERPL-SCNC: 139 MMOL/L (ref 135–145)
WBC # BLD AUTO: 5.8 K/UL (ref 4.8–10.8)

## 2022-05-17 PROCEDURE — 72148 MRI LUMBAR SPINE W/O DYE: CPT

## 2022-05-17 PROCEDURE — 71046 X-RAY EXAM CHEST 2 VIEWS: CPT

## 2022-05-17 PROCEDURE — 36415 COLL VENOUS BLD VENIPUNCTURE: CPT

## 2022-05-17 PROCEDURE — 85610 PROTHROMBIN TIME: CPT

## 2022-05-17 PROCEDURE — 80048 BASIC METABOLIC PNL TOTAL CA: CPT

## 2022-05-17 PROCEDURE — 85730 THROMBOPLASTIN TIME PARTIAL: CPT

## 2022-05-17 PROCEDURE — 85025 COMPLETE CBC W/AUTO DIFF WBC: CPT

## 2022-07-08 ENCOUNTER — HOSPITAL ENCOUNTER (OUTPATIENT)
Dept: RADIOLOGY | Facility: MEDICAL CENTER | Age: 37
End: 2022-07-08
Attending: STUDENT IN AN ORGANIZED HEALTH CARE EDUCATION/TRAINING PROGRAM
Payer: COMMERCIAL

## 2022-07-08 ENCOUNTER — HOSPITAL ENCOUNTER (OUTPATIENT)
Dept: RADIOLOGY | Facility: MEDICAL CENTER | Age: 37
End: 2022-07-08
Attending: STUDENT IN AN ORGANIZED HEALTH CARE EDUCATION/TRAINING PROGRAM

## 2022-07-08 DIAGNOSIS — M54.50 LOW BACK PAIN, UNSPECIFIED BACK PAIN LATERALITY, UNSPECIFIED CHRONICITY, UNSPECIFIED WHETHER SCIATICA PRESENT: ICD-10-CM

## 2022-07-08 PROCEDURE — 72110 X-RAY EXAM L-2 SPINE 4/>VWS: CPT

## 2025-05-19 ENCOUNTER — APPOINTMENT (OUTPATIENT)
Dept: RADIOLOGY | Facility: IMAGING CENTER | Age: 40
End: 2025-05-19
Attending: STUDENT IN AN ORGANIZED HEALTH CARE EDUCATION/TRAINING PROGRAM
Payer: COMMERCIAL

## 2025-05-19 ENCOUNTER — OCCUPATIONAL MEDICINE (OUTPATIENT)
Dept: URGENT CARE | Facility: CLINIC | Age: 40
End: 2025-05-19
Payer: COMMERCIAL

## 2025-05-19 VITALS
BODY MASS INDEX: 23.32 KG/M2 | TEMPERATURE: 98.4 F | DIASTOLIC BLOOD PRESSURE: 74 MMHG | WEIGHT: 175.93 LBS | SYSTOLIC BLOOD PRESSURE: 118 MMHG | RESPIRATION RATE: 14 BRPM | OXYGEN SATURATION: 96 % | HEART RATE: 74 BPM | HEIGHT: 73 IN

## 2025-05-19 DIAGNOSIS — S60.222A CONTUSION OF LEFT HAND, INITIAL ENCOUNTER: Primary | ICD-10-CM

## 2025-05-19 DIAGNOSIS — S60.222A CONTUSION OF LEFT HAND, INITIAL ENCOUNTER: ICD-10-CM

## 2025-05-19 PROCEDURE — 3078F DIAST BP <80 MM HG: CPT | Performed by: STUDENT IN AN ORGANIZED HEALTH CARE EDUCATION/TRAINING PROGRAM

## 2025-05-19 PROCEDURE — 73110 X-RAY EXAM OF WRIST: CPT | Mod: TC,LT | Performed by: RADIOLOGY

## 2025-05-19 PROCEDURE — 99203 OFFICE O/P NEW LOW 30 MIN: CPT | Performed by: STUDENT IN AN ORGANIZED HEALTH CARE EDUCATION/TRAINING PROGRAM

## 2025-05-19 PROCEDURE — 3074F SYST BP LT 130 MM HG: CPT | Performed by: STUDENT IN AN ORGANIZED HEALTH CARE EDUCATION/TRAINING PROGRAM

## 2025-05-19 PROCEDURE — 1125F AMNT PAIN NOTED PAIN PRSNT: CPT | Performed by: STUDENT IN AN ORGANIZED HEALTH CARE EDUCATION/TRAINING PROGRAM

## 2025-05-19 ASSESSMENT — PAIN SCALES - GENERAL: PAINLEVEL_OUTOF10: 4=SLIGHT-MODERATE PAIN

## 2025-05-19 NOTE — LETTER
"  EMPLOYEE’S CLAIM FOR COMPENSATION/ REPORT OF INITIAL TREATMENT  FORM C-4  PLEASE TYPE OR PRINT    EMPLOYEE’S CLAIM - PROVIDE ALL INFORMATION REQUESTED   First Name                    BROOKLYN Rollins                  Last Name  Maribell Birthdate                    1985                Sex  [x]Male Claim Number (Insurer’s Use Only)     Mailing Address  [address] Age  40 y.o. Height  1.854 m (6' 1\") Weight  79.8 kg (175 lb 14.8 oz) Social Security Number     [facility] Zip  [address] Telephone  There are no phone numbers on file.   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 (if applicable)      How did this injury or occupational disease occur? (Be specific and answer in detail. Use additional sheet if necessary)     If you believe that you have an occupational disease, when did you first have knowledge of the disability and its relationship to your employment?   Witnesses to the Accident (if applicable)        Nature of Injury or Occupational Disease    Part(s) of Body Injured or Affected        I CERTIFY THAT THE ABOVE IS TRUE AND CORRECT TO T HE BEST OF MY KNOWLEDGE AND THAT I HAVE PROVIDED THIS INFORMATION IN ORDER TO OBTAIN THE BENEFITS OF [place], " INCLUSIVE, OR CHAPTER 617 OF NRS).  I HEREBY AUTHORIZE ANY PHYSICIAN, CHIROPRACTOR, SURGEON, PRACTITIONER OR ANY OTHER PERSON, ANY HOSPITAL, INCLUDING [facility], ANY  MEDICAL SERVICE ORGANIZATION, ANY INSURANCE COMPANY, OR OTHER INSTITUTION OR ORGANIZATION TO RELEASE TO EACH OTHER, ANY MEDICAL OR OTHER INFORMATION, INCLUDING BENEFITS PAID OR PAYABLE, PERTINENT TO THIS INJURY OR DISEASE, EXCEPT INFORMATION RELATIVE TO DIAGNOSIS, TREATMENT AND/OR COUNSELING FOR AIDS, PSYCHOLOGICAL CONDITIONS, ALCOHOL OR CONTROLLED SUBSTANCES, FOR WHICH I MUST GIVE SPECIFIC AUTHORIZATION.  A PHOTOSTAT OF THIS AUTHORIZATION SHALL BE VALID AS THE ORIGINAL.     Date   Place Employee’s Original or  *Electronic Signature   THIS REPORT MUST BE COMPLETED AND MAILED WITHIN 3 WORKING DAYS OF TREATMENT   Place  [facility]    Name of Facility  [facility]   Date 5/19/2025 Diagnosis and Description of Injury or Occupational Disease  (S60.222A) Contusion of left hand, initial encounter  (primary encounter diagnosis)  The encounter diagnosis was Contusion of left hand, initial encounter. Is there evidence that the injured employee was under the influence of alcohol and/or another controlled substance at the time of accident?  []No  [] Yes (if yes, please explain)   Hour 10:10 AM  No   Treatment: X-rays negative for any acute osseous abnormalities.  History consistent with a hand contusion.  - No lifting with left hand   -Ibuprofen 800 mg every 8 hours as needed for symptomatic relief  -Reviewed home stretches and exercises  -Follow-up in in approximately 2 weeks after returning from [place].      Have you advised the patient to remain off work five days or more?   No  [] Yes  If yes, indicate dates: From_ _                                                      To __ _  [] No   If no, is the injured employee capable of: [] full duty No   [] modified duty Yes    If modified duty, specify any  limitations / restrictions:__________________  ___See e27___________________________     X-Ray Findings: Negative    From information given by the employee, together with medical evidence, can you directly connect this injury or occupational disease as job incurred?  []Yes   [] No Yes    Is additional medical care by a physician indicated? []Yes [] No  Yes    Do you know of any previous injury or disease contributing to this condition or occupational disease? []Yes [] No (Explain if yes)                          No   Date  5/19/2025 Print Health Care Provider’s Name  Donnell Gibbs D.O. I certify that the employer’s copy of  this form was delivered to the employer on:   Address  [address] [address] INSURER'S USE ONLY                       [facility] Zip  [address] Provider’s Tax ID Number  [id number]   Telephone  Dept: [phone]    Health Care Provider’s Original or Electronic Signature      e-DONNELL Stock D.O.    Degree (MD,DO, DC,PA-C,APRN)  DO  Choose (if applicable)      ORIGINAL - TREATING HEALTHCARE PROVIDER PAGE 2 - INSURER/TPA PAGE 3 - EMPLOYER PAGE 4 - EMPLOYEE             Form C-4 (rev.02/25)

## 2025-05-19 NOTE — LETTER
"  EMPLOYEE’S CLAIM FOR COMPENSATION/ REPORT OF INITIAL TREATMENT  FORM C-4  PLEASE TYPE OR PRINT    EMPLOYEE’S CLAIM - PROVIDE ALL INFORMATION REQUESTED   First Name                    BROOKLYN Rollins                  Last Name  Maribell Birthdate                    1985                Sex  [x]Male Claim Number (Insurer’s Use Only)     Mailing Address  [address] Age  40 y.o. Height  1.854 m (6' 1\") Weight  79.8 kg (175 lb 14.8 oz) Social Security Number     [facility] Zip  [address] Telephone  There are no phone numbers on file.   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 (Be specific and answer in detail. Use additional sheet if necessary)  Pallet with a rear drive unit on it was between stopping alf inside of the second etch machine. When trying to get it out and page the stoppers, my left hand got caught under the pallet   If you believe that you have an occupational disease, when did you first have knowledge of the disability and its relationship to your employment?   Witnesses to the Accident (if applicable)  none      Nature of Injury or " Occupational Disease  Workers' Compensation  Part(s) of Body Injured or Affected  Hand (L) N/A N/A    I CERTIFY THAT THE ABOVE IS TRUE AND CORRECT TO T HE BEST OF MY KNOWLEDGE AND THAT I HAVE PROVIDED THIS INFORMATION IN ORDER TO OBTAIN THE BENEFITS OF [place] AND [place], IN[place]IVE, OR CHAPTER 617 OF [place]).  I HEREBY AUTHORIZE ANY PHYSICIAN, CHIROPRACTOR, SURGEON, PRACTITIONER OR ANY OTHER PERSON, ANY HOSPITAL, INCLUDING [facility] OR [facility], ANY  MEDICAL SERVICE ORGANIZATION, ANY INSURANCE COMPANY, OR OTHER INSTITUTION OR ORGANIZATION TO RELEASE TO EACH OTHER, ANY MEDICAL OR OTHER INFORMATION, INCLUDING BENEFITS PAID OR PAYABLE, PERTINENT TO THIS INJURY OR DISEASE, EXCEPT INFORMATION RELATIVE TO DIAGNOSIS, TREATMENT AND/OR COUNSELING FOR AIDS, PSYCHOLOGICAL CONDITIONS, ALCOHOL OR CONTROLLED SUBSTANCES, FOR WHICH I MUST GIVE SPECIFIC AUTHORIZATION.  A PHOTOSTAT OF THIS AUTHORIZATION SHALL BE VALID AS THE ORIGINAL.     Date 5/19/25   [facility] Employee’s Original or  *Electronic Signature   THIS REPORT MUST BE COMPLETED AND MAILED WITHIN 3 WORKING DAYS OF TREATMENT   Place  [facility]    Name of Facility  [facility]   Date 5/19/2025 Diagnosis and Description of Injury or Occupational Disease  (S60.222A) Contusion of left hand, initial encounter  (primary encounter diagnosis)  The encounter diagnosis was Contusion of left hand, initial encounter. Is there evidence that the injured employee was under the influence of alcohol and/or another controlled substance at the time of accident?  []No  [] Yes (if yes, please explain)   Hour 10:10 AM  No   Treatment: X-rays negative for any acute osseous abnormalities.  History consistent with a hand contusion.  - No lifting with left hand   -Ibuprofen 800 mg every 8 hours as needed for symptomatic relief  -Reviewed home stretches and exercises  -Follow-up in in  approximately 2 weeks after returning from [place].      Have you advised the patient to remain off work five days or more?   No  [] Yes  If yes, indicate dates: From_ _                                                      To __ _  [] No   If no, is the injured employee capable of: [] full duty No   [] modified duty Yes    If modified duty, specify any limitations / restrictions:__________________  ___See j00___________________________     X-Ray Findings: Negative    From information given by the employee, together with medical evidence, can you directly connect this injury or occupational disease as job incurred?  []Yes   [] No Yes    Is additional medical care by a physician indicated? []Yes [] No  Yes    Do you know of any previous injury or disease contributing to this condition or occupational disease? []Yes [] No (Explain if yes)                          No   Date  5/19/2025 Print Health Care Provider’s Name  Donnell Gibbs D.O. I certify that the employer’s copy of  this form was delivered to the employer on:   Address  [address] [facility] [address] INSURER'S USE ONLY                       [facility] Zip  [address] Provider’s Tax ID Number  [id number]   Telephone  Dept: [phone]    Health Care Provider’s Original or Electronic Signature      e-DONNELL Stock D.O.    Degree (MD,DO, DC,PA-C,APRN)  DO  Choose (if applicable)      ORIGINAL - TREATING HEALTHCARE PROVIDER PAGE 2 - INSURER/TPA PAGE 3 - EMPLOYER PAGE 4 - EMPLOYEE             Form C-4 (rev.02/25)

## 2025-05-19 NOTE — LETTER
PHYSICIAN’S AND CHIROPRACTIC PHYSICIAN'S   PROGRESS REPORT   CERTIFICATION OF DISABILITY Claim Number:     Social Security Number:    Patient’s Name: Ayo Reyna Date of Injury: 5/19/25   Employer: [employer] Name of MCO (if applicable):      Patient’s Job Description/Occupation:        Previous Injuries/Diseases/Surgeries Contributing to the Condition:  Ayo Reyna is a 40 y.o. male who presents for evaluation of a left hand injury.    Date of injury: 5/18/2025.  Today's date: 5/19/2025.  Visit #1.    He sustained the injury on 05/18/2025 (yesterday) while operating a pallet drive unit, during which his hand became entrapped between two stoppers inside the machine. The incident resulted in a drop of the pallet onto his hand. He reports immediate swelling and pain, particularly localized to the thumb and radial  aspect of the hand.  Pain is aggravated with general range of motion.  He has not taken any over-the-counter analgesics such as Tylenol or ibuprofen to alleviate the symptoms. He has no prior history of injuries or surgical interventions on the same hand.        Diagnosis: (S60.222A) Contusion of left hand, initial encounter  (primary encounter diagnosis)      Related to the Industrial Injury? Yes     Explain: Happened at work      Objective Medical Findings: Gen: no acute distress, normal voice  Skin: dry, intact, moist mucosal membranes  Head: Atraumatic, normocephalic  Psych: normal affect, normal judgement, alert, awake  Musculoskeletal: Left hand without any erythema, ecchymosis or edema.  Full range of motion with flexion and extension without presence of scissoring.  TTP along the snuffbox and proximal metacarpals 2 and 3 without any step-off or crepitus.  No motor or sensory deficits with good perfusion of distal soft tissue.  No presence of a subungual hematoma.           None - Discharged                         Stable  No                 Ratable  No         Generally Improved                         Condition Worsened                  Condition Same  May Have Suffered a Permanent Disability No     Treatment Plan:    X-rays negative for any acute osseous abnormalities.  History consistent with a hand contusion.  - No lifting with left hand   -Ibuprofen 800 mg every 8 hours as needed for symptomatic relief  -Reviewed home stretches and exercises  -Follow-up in in approximately 2 weeks after returning from [place].         No Change in Therapy                  PT/OT Prescribed                      Medication May be Used While Working        Case Management                          PT/OT Discontinued    Consultation    Further Diagnostic Studies:    Prescription(s)                 Released to FULL DUTY /No Restrictions on (Date):       Certified TOTALLY TEMPORARILY DISABLED (Indicate Dates) From:   To:    X  Released to RESTRICTED/Modified Duty on (Date): From: 5/19/2025 To: 6/5/2025  Restrictions Are:         No Sitting    No Standing    No Pulling Other: No lifting or fine hand manipulation of left hand       No Bending at Waist     No Stooping     No Lifting        No Carrying     No Walking Lifting Restricted to (lbs.):          No Pushing        No Climbing     No Reaching Above Shoulders       Date of Next Visit:  6/5/2025 Date of this Exam: 5/19/2025 Physician/Chiropractic Physician Name: Donnell Gibbs D.O. Physician/Chiropractic Physician Signature:  Hector Sher DO MPH     [place]:  [address] [place] [address], [address] [place], [place] [address] - Telephone [phone] [place]:  [address] [place] [address], [address] [place], [place] [address] - Telephone [phone]    [email or web address]  D-39 (Rev. 10/24)

## 2025-05-19 NOTE — LETTER
"  EMPLOYEE’S CLAIM FOR COMPENSATION/ REPORT OF INITIAL TREATMENT  FORM C-4  PLEASE TYPE OR PRINT    EMPLOYEE’S CLAIM - PROVIDE ALL INFORMATION REQUESTED   First Name                    BROOKLYN Rollins                  Last Name  Maribell Birthdate                    1985                Sex  [x]Male Claim Number (Insurer’s Use Only)     Mailing Address  [address] Age  40 y.o. Height  1.854 m (6' 1\") Weight  79.8 kg (175 lb 14.8 oz) Social Security Number     [facility] Zip  [address] Telephone  There are no phone numbers on file.   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 (if applicable)      How did this injury or occupational disease occur? (Be specific and answer in detail. Use additional sheet if necessary)     If you believe that you have an occupational disease, when did you first have knowledge of the disability and its relationship to your employment?   Witnesses to the Accident (if applicable)        Nature of Injury or Occupational Disease    Part(s) of Body Injured or Affected        I CERTIFY THAT THE ABOVE IS TRUE AND CORRECT TO T HE BEST OF MY KNOWLEDGE AND THAT I HAVE PROVIDED THIS INFORMATION IN ORDER TO OBTAIN THE BENEFITS OF [place] [place], OR CHAPTER 617 OF [place]RS).  I HEREBY AUTHORIZE ANY PHYSICIAN, CHIROPRACTOR, SURGEON, PRACTITIONER OR ANY OTHER PERSON, ANY HOSPITAL, INCLUDING [facility] OR [facility], ANY  MEDICAL SERVICE ORGANIZATION, ANY INSURANCE COMPANY, OR OTHER INSTITUTION OR ORGANIZATION TO RELEASE TO EACH OTHER, ANY MEDICAL OR OTHER INFORMATION, INCLUDING BENEFITS PAID OR PAYABLE, PERTINENT TO THIS INJURY OR DISEASE, EXCEPT INFORMATION RELATIVE TO DIAGNOSIS, TREATMENT AND/OR COUNSELING FOR AIDS, PSYCHOLOGICAL CONDITIONS, ALCOHOL OR CONTROLLED SUBSTANCES, FOR WHICH I MUST GIVE SPECIFIC AUTHORIZATION.  A PHOTOSTAT OF THIS AUTHORIZATION SHALL BE VALID AS THE ORIGINAL.     Date 5/19/25   Place [facility] Employee’s Original or  *Electronic Signature   THIS REPORT MUST BE COMPLETED AND MAILED WITHIN 3 WORKING DAYS OF TREATMENT   Place  [facility]    Name of Facility  [facility]   Date 5/19/2025 Diagnosis and Description of Injury or Occupational Disease  (S60.222A) Contusion of left hand, initial encounter  (primary encounter diagnosis)  The encounter diagnosis was Contusion of left hand, initial encounter. Is there evidence that the injured employee was under the influence of alcohol and/or another controlled substance at the time of accident?  []No  [] Yes (if yes, please explain)   Hour 10:10 AM  No   Treatment: X-rays negative for any acute osseous abnormalities.  History consistent with a hand contusion.  - No lifting with left hand   -Ibuprofen 800 mg every 8 hours as needed for symptomatic relief  -Reviewed home stretches and exercises  -Follow-up in in approximately 2 weeks after returning from [place].      Have you advised the patient to remain off work five days or more?   No  [] Yes  If yes, indicate dates: From_ _                                                      To __ _  [] No   If no, is the injured employee capable of: [] full duty No   [] modified duty  Yes    If modified duty, specify any limitations / restrictions:__________________  ___See m83___________________________     X-Ray Findings: Negative    From information given by the employee, together with medical evidence, can you directly connect this injury or occupational disease as job incurred?  []Yes   [] No Yes    Is additional medical care by a physician indicated? []Yes [] No  Yes    Do you know of any previous injury or disease contributing to this condition or occupational disease? []Yes [] No (Explain if yes)                          No   Date  5/19/2025 Print Health Care Provider’s Name  Donnell Gibbs D.O. I certify that the employer’s copy of  this form was delivered to the employer on:   Address  [address] [address] INSURER'S USE ONLY                       [facility] Zip  [address] Provider’s Tax ID Number  [id number]   Telephone  Dept: [phone]    Health Care Provider’s Original or Electronic Signature      e-DONNELL Stock D.O.    Degree (MD,DO, DC,PA-C,APRN)  DO  Choose (if applicable)      ORIGINAL - TREATING HEALTHCARE PROVIDER PAGE 2 - INSURER/TPA PAGE 3 - EMPLOYER PAGE 4 - EMPLOYEE             Form C-4 (rev.02/25)

## 2025-05-19 NOTE — LETTER
"  EMPLOYEE’S CLAIM FOR COMPENSATION/ REPORT OF INITIAL TREATMENT  FORM C-4  PLEASE TYPE OR PRINT    EMPLOYEE’S CLAIM - PROVIDE ALL INFORMATION REQUESTED   First Name                    BROOKLYN Rollins                  Last Name  Maribell Birthdate                    1985                Sex  [x]Male Claim Number (Insurer’s Use Only)     Mailing Address  [address] Age  40 y.o. Height  1.854 m (6' 1\") Weight  79.8 kg (175 lb 14.8 oz) Social Security Number     [facility] Zip  [address] Telephone  There are no phone numbers on file.   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 (if applicable)      How did this injury or occupational disease occur? (Be specific and answer in detail. Use additional sheet if necessary)     If you believe that you have an occupational disease, when did you first have knowledge of the disability and its relationship to your employment?   Witnesses to the Accident (if applicable)        Nature of Injury or Occupational Disease    Part(s) of Body Injured or Affected        I CERTIFY THAT THE ABOVE IS TRUE AND CORRECT TO T HE BEST OF MY KNOWLEDGE AND THAT I HAVE PROVIDED THIS INFORMATION IN ORDER TO OBTAIN THE BENEFITS OF [place] [place], [place], OR CHAPTER 617 OF [place]).  I HEREBY AUTHORIZE ANY PHYSICIAN, CHIROPRACTOR, SURGEON, PRACTITIONER OR ANY OTHER PERSON, ANY HOSPITAL, INCLUDING [facility] OR [facility], ANY  MEDICAL SERVICE ORGANIZATION, ANY INSURANCE COMPANY, OR OTHER INSTITUTION OR ORGANIZATION TO RELEASE TO EACH OTHER, ANY MEDICAL OR OTHER INFORMATION, INCLUDING BENEFITS PAID OR PAYABLE, PERTINENT TO THIS INJURY OR DISEASE, EXCEPT INFORMATION RELATIVE TO DIAGNOSIS, TREATMENT AND/OR COUNSELING FOR AIDS, PSYCHOLOGICAL CONDITIONS, ALCOHOL OR CONTROLLED SUBSTANCES, FOR WHICH I MUST GIVE SPECIFIC AUTHORIZATION.  A PHOTOSTAT OF THIS AUTHORIZATION SHALL BE VALID AS THE ORIGINAL.     Date 5/19/25   Place [facility] Employee’s Original or  *Electronic Signature   THIS REPORT MUST BE COMPLETED AND MAILED WITHIN 3 WORKING DAYS OF TREATMENT   Place  [facility]    Name of Facility  [facility]   Date 5/19/2025 Diagnosis and Description of Injury or Occupational Disease  (S60.222A) Contusion of left hand, initial encounter  (primary encounter diagnosis)  The encounter diagnosis was Contusion of left hand, initial encounter. Is there evidence that the injured employee was under the influence of alcohol and/or another controlled substance at the time of accident?  []No  [] Yes (if yes, please explain)   Hour 10:10 AM  No   Treatment: X-rays negative for any acute osseous abnormalities.  History consistent with a hand contusion.  - No lifting with left hand   -Ibuprofen 800 mg every 8 hours as needed for symptomatic relief  -Reviewed home stretches and exercises  -Follow-up in in approximately 2 weeks after returning from [place].      Have you advised the patient to remain off work five days or more?   No  [] Yes  If yes, indicate dates: From_ _                                                      To __ _  [] No   If no, is the injured employee capable of: [] full duty No   [] modified duty  Yes    If modified duty, specify any limitations / restrictions:__________________  ___See v90___________________________     X-Ray Findings: Negative    From information given by the employee, together with medical evidence, can you directly connect this injury or occupational disease as job incurred?  []Yes   [] No Yes    Is additional medical care by a physician indicated? []Yes [] No  Yes    Do you know of any previous injury or disease contributing to this condition or occupational disease? []Yes [] No (Explain if yes)                          No   Date  5/19/2025 Print Health Care Provider’s Name  Donnell Gibbs D.O. I certify that the employer’s copy of  this form was delivered to the employer on:   Address  [address] [address] INSURER'S USE ONLY                       [facility] Zip  [address] Provider’s Tax ID Number  [id number]   Telephone  Dept: [phone]    Health Care Provider’s Original or Electronic Signature      e-DONNELL Stock D.O.    Degree (MD,DO, DC,PA-C,APRN)  DO  Choose (if applicable)      ORIGINAL - TREATING HEALTHCARE PROVIDER PAGE 2 - INSURER/TPA PAGE 3 - EMPLOYER PAGE 4 - EMPLOYEE             Form C-4 (rev.02/25)

## 2025-05-19 NOTE — PROGRESS NOTES
"Subjective:      Work-Related Injury (Sally New W/C /DOI 5/18/25/Left hand)          History of Present Illness  Ayo Reyna is a 40 y.o. male who presents for evaluation of a left hand injury.    He sustained the injury on 05/18/2025 (yesterday) while operating a pallet drive unit, during which his hand became entrapped between two stoppers inside the machine. The incident resulted in a drop of the pallet onto his hand. He reports immediate swelling and pain, particularly localized to the thumb and radial  aspect of the hand.  Pain is aggravated with general range of motion.  He has not taken any over-the-counter analgesics such as Tylenol or ibuprofen to alleviate the symptoms. He has no prior history of injuries or surgical interventions on the same hand.        PMH:   No pertinent past medical history to this problem  MEDS:  Medications were reviewed in EMR  ALLERGIES:  Allergies were reviewed in EMR  FH:   No pertinent family history to this problem       Objective:     /74   Pulse 74   Temp 36.9 °C (98.4 °F) (Temporal)   Resp 14   Ht 1.854 m (6' 1\")   Wt 79.8 kg (175 lb 14.8 oz)   SpO2 96%   BMI 23.21 kg/m²     Gen: no acute distress, normal voice  Skin: dry, intact, moist mucosal membranes  Head: Atraumatic, normocephalic  Psych: normal affect, normal judgement, alert, awake  Musculoskeletal: Left hand without any erythema, ecchymosis or edema.  Full range of motion with flexion and extension without presence of scissoring.  TTP along the snuffbox and proximal metacarpals 2 and 3 without any step-off or crepitus.  No motor or sensory deficits with good perfusion of distal soft tissue.  No presence of a subungual hematoma.      Assessment/Plan:       1. Contusion of left hand, initial encounter  - DX-WRIST-COMPLETE 3+ LEFT; Future      FROM   TO    No lifting or fine hand manipulation of left hand     X-rays negative for any acute osseous abnormalities.  History consistent with a hand " contusion.  - No lifting with left hand   -Ibuprofen 800 mg every 8 hours as needed for symptomatic relief  -Reviewed home stretches and exercises  -Follow-up in in approximately 2 weeks after returning from [place].

## 2025-05-19 NOTE — LETTER
PHYSICIAN’S AND CHIROPRACTIC PHYSICIAN'S   PROGRESS REPORT   CERTIFICATION OF DISABILITY Claim Number:     Social Security Number:    Patient’s Name: Ayo Reyna Date of Injury: 5/19/25   Employer: [employer] Name of MCO (if applicable):      Patient’s Job Description/Occupation:        Previous Injuries/Diseases/Surgeries Contributing to the Condition:  Ayo Reyna is a 40 y.o. male who presents for evaluation of a left hand injury.    He sustained the injury on 05/18/2025 (yesterday) while operating a pallet drive unit, during which his hand became entrapped between two stoppers inside the machine. The incident resulted in a drop of the pallet onto his hand. He reports immediate swelling and pain, particularly localized to the thumb and radial  aspect of the hand.  Pain is aggravated with general range of motion.  He has not taken any over-the-counter analgesics such as Tylenol or ibuprofen to alleviate the symptoms. He has no prior history of injuries or surgical interventions on the same hand.        Diagnosis: (S60.222A) Contusion of left hand, initial encounter  (primary encounter diagnosis)      Related to the Industrial Injury? Yes     Explain: Happened at work      Objective Medical Findings: Gen: no acute distress, normal voice  Skin: dry, intact, moist mucosal membranes  Head: Atraumatic, normocephalic  Psych: normal affect, normal judgement, alert, awake  Musculoskeletal: Left hand without any erythema, ecchymosis or edema.  Full range of motion with flexion and extension without presence of scissoring.  TTP along the snuffbox and proximal metacarpals 2 and 3 without any step-off or crepitus.  No motor or sensory deficits with good perfusion of distal soft tissue.  No presence of a subungual hematoma.           None - Discharged                         Stable  No                 Ratable  No        Generally Improved                         Condition Worsened                   Condition Same  May Have Suffered a Permanent Disability No     Treatment Plan:    X-rays negative for any acute osseous abnormalities.  History consistent with a hand contusion.  - No lifting with left hand   -Ibuprofen 800 mg every 8 hours as needed for symptomatic relief  -Reviewed home stretches and exercises  -Follow-up in in approximately 2 weeks after returning from [place].         No Change in Therapy                  PT/OT Prescribed                      Medication May be Used While Working        Case Management                          PT/OT Discontinued    Consultation    Further Diagnostic Studies:    Prescription(s)                 Released to FULL DUTY /No Restrictions on (Date):       Certified TOTALLY TEMPORARILY DISABLED (Indicate Dates) From:   To:    X  Released to RESTRICTED/Modified Duty on (Date): From: 5/19/2025 To: 6/5/2025  Restrictions Are:         No Sitting    No Standing    No Pulling Other: No lifting or fine hand manipulation of left hand       No Bending at Waist     No Stooping     No Lifting        No Carrying     No Walking Lifting Restricted to (lbs.):          No Pushing        No Climbing     No Reaching Above Shoulders       Date of Next Visit:  6/5/2025 Date of this Exam: 5/19/2025 Physician/Chiropractic Physician Name: Donnell Gibbs D.O. Physician/Chiropractic Physician Signature:  Hector Sher DO MPH     [place]:  [address] [place] [address], [address] [place], [place] [address] - Telephone [phone] [place]:  [address] [place] [address], [address] [place], [place] [address] - Telephone [phone]    [email or web address]  D-39 (Rev. 10/24)

## 2025-08-13 ENCOUNTER — OFFICE VISIT (OUTPATIENT)
Dept: URGENT CARE | Facility: CLINIC | Age: 40
End: 2025-08-13
Payer: COMMERCIAL

## 2025-08-13 ENCOUNTER — APPOINTMENT (OUTPATIENT)
Dept: RADIOLOGY | Facility: IMAGING CENTER | Age: 40
End: 2025-08-13
Attending: NURSE PRACTITIONER
Payer: COMMERCIAL

## 2025-08-13 ENCOUNTER — APPOINTMENT (OUTPATIENT)
Dept: URGENT CARE | Facility: CLINIC | Age: 40
End: 2025-08-13
Payer: COMMERCIAL

## 2025-08-13 VITALS
SYSTOLIC BLOOD PRESSURE: 128 MMHG | TEMPERATURE: 97 F | BODY MASS INDEX: 23.37 KG/M2 | WEIGHT: 176.37 LBS | HEART RATE: 87 BPM | RESPIRATION RATE: 16 BRPM | OXYGEN SATURATION: 96 % | DIASTOLIC BLOOD PRESSURE: 82 MMHG | HEIGHT: 73 IN

## 2025-08-13 DIAGNOSIS — M79.641 RIGHT HAND PAIN: ICD-10-CM

## 2025-08-13 DIAGNOSIS — R10.31 RIGHT GROIN PAIN: ICD-10-CM
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
## 2025-08-13 PROCEDURE — 73130 X-RAY EXAM OF HAND: CPT | Mod: TC,RT | Performed by: RADIOLOGY

## 2025-08-14 ENCOUNTER — HOSPITAL ENCOUNTER (OUTPATIENT)
Dept: RADIOLOGY | Facility: MEDICAL CENTER | Age: 40
End: 2025-08-14
Attending: NURSE PRACTITIONER
Payer: COMMERCIAL

## 2025-08-14 DIAGNOSIS — R10.31 RIGHT GROIN PAIN: ICD-10-CM

## 2025-08-14 PROCEDURE — 76857 US EXAM PELVIC LIMITED: CPT
